# Patient Record
Sex: MALE | Race: WHITE | NOT HISPANIC OR LATINO
[De-identification: names, ages, dates, MRNs, and addresses within clinical notes are randomized per-mention and may not be internally consistent; named-entity substitution may affect disease eponyms.]

---

## 2019-08-28 ENCOUNTER — APPOINTMENT (OUTPATIENT)
Dept: ORTHOPEDIC SURGERY | Facility: CLINIC | Age: 74
End: 2019-08-28

## 2021-04-28 ENCOUNTER — APPOINTMENT (OUTPATIENT)
Dept: HEART AND VASCULAR | Facility: CLINIC | Age: 76
End: 2021-04-28

## 2021-05-26 ENCOUNTER — APPOINTMENT (OUTPATIENT)
Dept: HEART AND VASCULAR | Facility: CLINIC | Age: 76
End: 2021-05-26

## 2021-06-09 ENCOUNTER — APPOINTMENT (OUTPATIENT)
Dept: HEART AND VASCULAR | Facility: CLINIC | Age: 76
End: 2021-06-09
Payer: MEDICARE

## 2021-06-09 ENCOUNTER — APPOINTMENT (OUTPATIENT)
Dept: HEART AND VASCULAR | Facility: CLINIC | Age: 76
End: 2021-06-09

## 2021-06-09 VITALS — DIASTOLIC BLOOD PRESSURE: 82 MMHG | SYSTOLIC BLOOD PRESSURE: 124 MMHG

## 2021-06-09 VITALS — HEIGHT: 70 IN | WEIGHT: 175 LBS | BODY MASS INDEX: 25.05 KG/M2

## 2021-06-09 VITALS — TEMPERATURE: 98 F

## 2021-06-09 DIAGNOSIS — Z00.00 ENCOUNTER FOR GENERAL ADULT MEDICAL EXAMINATION W/OUT ABNORMAL FINDINGS: ICD-10-CM

## 2021-06-09 PROCEDURE — 93351 STRESS TTE COMPLETE: CPT

## 2021-06-09 PROCEDURE — 36415 COLL VENOUS BLD VENIPUNCTURE: CPT

## 2021-06-09 PROCEDURE — 99214 OFFICE O/P EST MOD 30 MIN: CPT | Mod: 25

## 2021-06-09 PROCEDURE — 93306 TTE W/DOPPLER COMPLETE: CPT | Mod: 59

## 2021-06-10 ENCOUNTER — NON-APPOINTMENT (OUTPATIENT)
Age: 76
End: 2021-06-10

## 2021-06-10 NOTE — ASSESSMENT
[FreeTextEntry1] : Patient had a negative stress echo patient's exercise tolerance is within normal limits patient should have a follow-up CAT scan to evaluate the descending aortic aneurysm

## 2021-06-10 NOTE — REASON FOR VISIT
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

## 2021-06-14 LAB
ALBUMIN SERPL ELPH-MCNC: 4.8 G/DL
ALP BLD-CCNC: 95 U/L
ALT SERPL-CCNC: 30 U/L
ANION GAP SERPL CALC-SCNC: 13 MMOL/L
AST SERPL-CCNC: 27 U/L
BASOPHILS # BLD AUTO: 0.05 K/UL
BASOPHILS NFR BLD AUTO: 0.6 %
BILIRUB SERPL-MCNC: 0.5 MG/DL
BUN SERPL-MCNC: 17 MG/DL
CALCIUM SERPL-MCNC: 10.2 MG/DL
CHLORIDE SERPL-SCNC: 102 MMOL/L
CHOLEST SERPL-MCNC: 111 MG/DL
CK SERPL-CCNC: 114 U/L
CO2 SERPL-SCNC: 22 MMOL/L
CREAT SERPL-MCNC: 0.86 MG/DL
EOSINOPHIL # BLD AUTO: 0.21 K/UL
EOSINOPHIL NFR BLD AUTO: 2.7 %
ESTIMATED AVERAGE GLUCOSE: 111 MG/DL
FERRITIN SERPL-MCNC: 52 NG/ML
GLUCOSE SERPL-MCNC: 92 MG/DL
HBA1C MFR BLD HPLC: 5.5 %
HCT VFR BLD CALC: 42.5 %
HDLC SERPL-MCNC: 39 MG/DL
HGB BLD-MCNC: 14.8 G/DL
IMM GRANULOCYTES NFR BLD AUTO: 0.6 %
LDLC SERPL CALC-MCNC: 39 MG/DL
LYMPHOCYTES # BLD AUTO: 1.46 K/UL
LYMPHOCYTES NFR BLD AUTO: 18.8 %
MAN DIFF?: NORMAL
MCHC RBC-ENTMCNC: 32.7 PG
MCHC RBC-ENTMCNC: 34.8 GM/DL
MCV RBC AUTO: 93.8 FL
MONOCYTES # BLD AUTO: 0.61 K/UL
MONOCYTES NFR BLD AUTO: 7.9 %
NEUTROPHILS # BLD AUTO: 5.38 K/UL
NEUTROPHILS NFR BLD AUTO: 69.4 %
NONHDLC SERPL-MCNC: 72 MG/DL
PLATELET # BLD AUTO: 376 K/UL
POTASSIUM SERPL-SCNC: 4.8 MMOL/L
PROT SERPL-MCNC: 7.8 G/DL
PSA FREE FLD-MCNC: 28 %
PSA FREE SERPL-MCNC: 0.17 NG/ML
PSA SERPL-MCNC: 0.6 NG/ML
RBC # BLD: 4.53 M/UL
RBC # FLD: 12.6 %
SODIUM SERPL-SCNC: 138 MMOL/L
TRIGL SERPL-MCNC: 168 MG/DL
TSH SERPL-ACNC: 1.49 UIU/ML
VIT B12 SERPL-MCNC: 1904 PG/ML
VLDLC SERPL-MCNC: 34 MG/DL
WBC # FLD AUTO: 7.76 K/UL

## 2021-09-29 ENCOUNTER — APPOINTMENT (OUTPATIENT)
Dept: HEART AND VASCULAR | Facility: CLINIC | Age: 76
End: 2021-09-29
Payer: MEDICARE

## 2021-09-29 VITALS
BODY MASS INDEX: 24.77 KG/M2 | HEIGHT: 70 IN | HEART RATE: 86 BPM | SYSTOLIC BLOOD PRESSURE: 140 MMHG | WEIGHT: 173 LBS | OXYGEN SATURATION: 97 % | DIASTOLIC BLOOD PRESSURE: 88 MMHG

## 2021-09-29 PROCEDURE — 99214 OFFICE O/P EST MOD 30 MIN: CPT

## 2021-09-30 NOTE — ASSESSMENT
[FreeTextEntry1] : Patient with a left bundle branch block had a recent stress echo that was negative patient had one episode where he was running for the [email or web address] where he felt a chest tightness patient had a CTA back in 2008 that had normal coronaries will send patient for a repeat CTA

## 2021-10-04 ENCOUNTER — OUTPATIENT (OUTPATIENT)
Dept: OUTPATIENT SERVICES | Facility: HOSPITAL | Age: 76
LOS: 1 days | End: 2021-10-04

## 2021-10-04 ENCOUNTER — APPOINTMENT (OUTPATIENT)
Dept: CT IMAGING | Facility: CLINIC | Age: 76
End: 2021-10-04
Payer: MEDICARE

## 2021-10-04 DIAGNOSIS — Z98.89 OTHER SPECIFIED POSTPROCEDURAL STATES: Chronic | ICD-10-CM

## 2021-10-04 PROCEDURE — 75574 CT ANGIO HRT W/3D IMAGE: CPT | Mod: 26,MH

## 2022-05-31 ENCOUNTER — APPOINTMENT (OUTPATIENT)
Dept: UROLOGY | Facility: CLINIC | Age: 77
End: 2022-05-31

## 2022-06-14 ENCOUNTER — APPOINTMENT (OUTPATIENT)
Dept: UROLOGY | Facility: CLINIC | Age: 77
End: 2022-06-14
Payer: MEDICARE

## 2022-06-14 VITALS — TEMPERATURE: 97.7 F | HEART RATE: 84 BPM | SYSTOLIC BLOOD PRESSURE: 172 MMHG | DIASTOLIC BLOOD PRESSURE: 95 MMHG

## 2022-06-14 DIAGNOSIS — N52.9 MALE ERECTILE DYSFUNCTION, UNSPECIFIED: ICD-10-CM

## 2022-06-14 DIAGNOSIS — N39.3 STRESS INCONTINENCE (FEMALE) (MALE): ICD-10-CM

## 2022-06-14 PROCEDURE — 99204 OFFICE O/P NEW MOD 45 MIN: CPT

## 2022-06-14 PROCEDURE — 51798 US URINE CAPACITY MEASURE: CPT

## 2022-06-14 NOTE — PHYSICAL EXAM
[General Appearance - Well Developed] : well developed [General Appearance - Well Nourished] : well nourished [Normal Appearance] : normal appearance [Well Groomed] : well groomed [General Appearance - In No Acute Distress] : no acute distress [Edema] : no peripheral edema [Respiration, Rhythm And Depth] : normal respiratory rhythm and effort [Exaggerated Use Of Accessory Muscles For Inspiration] : no accessory muscle use [Abdomen Soft] : soft [Abdomen Tenderness] : non-tender [Urethral Meatus] : meatus normal [Penis Abnormality] : normal circumcised penis [Urinary Bladder Findings] : the bladder was normal on palpation [Scrotum] : the scrotum was normal [Testes Mass (___cm)] : there were no testicular masses [Prostate Tenderness] : the prostate was not tender [No Prostate Nodules] : no prostate nodules [Prostate Size ___ gm] : prostate size [unfilled] gm [Normal Station and Gait] : the gait and station were normal for the patient's age [] : no rash [No Focal Deficits] : no focal deficits [Oriented To Time, Place, And Person] : oriented to person, place, and time [Affect] : the affect was normal [Mood] : the mood was normal [Not Anxious] : not anxious

## 2022-06-14 NOTE — ASSESSMENT
[FreeTextEntry1] : Diagnosis: Urinary incontinence, Pelvic Floor weakness\par \par Plan:\par PVR today was 34 cc\par discussed behavioral modifications\par Recommend he start with physical therapy.  If no improvement after physical therapy then suggest follow up with cystoscopy and possible UDS.\par \par ED\par -would like to start Trimix again.  It has been a while since he has used medication and is unsure of dosing.  Recommend he follow up with Dr. Damon for evaluation.\par \par RTC in 6 months \par

## 2022-06-14 NOTE — HISTORY OF PRESENT ILLNESS
[FreeTextEntry1] : Dr. Indra Kimble\par [address]\[place] [address]\par \par \par Mr. Celaya is a 80 year old male with a pMHx significant for HTN and MGUS (treated with Curcumin). \par \par Greenlight laser surgery 2019 for urinary frequency was on Tamsulosin prior to procedure but he stopped that..  Also noted possible  UTI after the Greenlight but may have been prior to Greenlight, not positive.\par \par He did notice improvement in his urinary symptoms after the Greenlight BUT developed urinary leakage post procedure.\par Over he past year the incontinence has worsened, " at times i look down and notice urine on the floor."   Notices more at night.  Urinary incontinence is exacerbated by fatigue and coffee.\par Will occasionally wear 1 pad.\par \par Also reports some difficulty obtaining erections \par Rates erections 5/10.  Was using Trimix and getting good response.\par \par Takes Finasteride 1 mg daily for a few years, stopped 1 week ago.\par \par PSA screening by Dr. Kimble, which have all been within the normal range.\par \par CT done in May 2022 for loss of appetite\par Renal cysts noted bilaterally, largest in the left lower pole measuring 8.5 cm\par \par PVR today is 34 cc\par \par \par Surgical Hx: Greenlight laser 2019, Radical neck surgery 1965.\par Social Hx: nonsmoker, minimal ETOH use, works as professor of weight loss\par Family Hx: 3 sisters with breast cancer, 3 cousins with Multiple myeloma

## 2022-06-15 LAB — PSA SERPL-MCNC: 0.69 NG/ML

## 2022-07-13 ENCOUNTER — APPOINTMENT (OUTPATIENT)
Dept: PULMONOLOGY | Facility: CLINIC | Age: 77
End: 2022-07-13

## 2022-07-13 VITALS
HEART RATE: 86 BPM | WEIGHT: 171 LBS | OXYGEN SATURATION: 98 % | BODY MASS INDEX: 24.48 KG/M2 | SYSTOLIC BLOOD PRESSURE: 144 MMHG | DIASTOLIC BLOOD PRESSURE: 91 MMHG | TEMPERATURE: 97.3 F | HEIGHT: 70 IN

## 2022-07-13 DIAGNOSIS — Z11.52 ENCOUNTER FOR SCREENING FOR COVID-19: ICD-10-CM

## 2022-07-13 PROCEDURE — 99204 OFFICE O/P NEW MOD 45 MIN: CPT | Mod: CS

## 2022-07-13 NOTE — REVIEW OF SYSTEMS
[Chest Discomfort] : chest discomfort [Fever] : no fever [Fatigue] : no fatigue [Cough] : no cough [Sputum] : no sputum [Dyspnea] : no dyspnea [SOB on Exertion] : no sob on exertion

## 2022-07-13 NOTE — ASSESSMENT
[FreeTextEntry1] : REVIEWED\par CT CHEST [facility] import 5/9/2022: tiny L pleural based nodule 2mm, otherwise normal\par \par 77M with history of melanoma s/p resection presents for evaluation for chest pain/SOB with exertion. Dyspnea is likely in setting of deconditioning and decreased exercise, and no evidence of parenchymal lung disease on CT chest. However, will obtain PFTs to rule out occult obstructive or restrictive lung disease, though there is no evidence of this on imaging.

## 2022-07-13 NOTE — HISTORY OF PRESENT ILLNESS
[Never] : never [TextBox_4] : 77M with history of melanoma (age 20, s/p resection and radical neck dissection; recurrence few years later at bridge of nose s/p resection and reconstruction) presents for evaluation for chest pain/SOB with exertion. Chest tightness on severe exertion- in day to day life no chest pain, able to ambulate and exert himself normally, can walk over a mile. Reduced his running during the pandemic, otherwise still active. SOB during running. No headaches, dizziness. Cardiac workup normal. Intermittent THC use few times a week, otherwise never smoker. No pets, no mold, no other exposures. Former professor at a med school. [ESS] : 0

## 2022-07-13 NOTE — END OF VISIT
[] : Fellow [FreeTextEntry3] : discussed the condition in details with the patient follow on PFT [Time Spent: ___ minutes] : I have spent [unfilled] minutes of time on the encounter.

## 2022-08-03 ENCOUNTER — APPOINTMENT (OUTPATIENT)
Dept: HEART AND VASCULAR | Facility: CLINIC | Age: 77
End: 2022-08-03

## 2022-08-03 ENCOUNTER — NON-APPOINTMENT (OUTPATIENT)
Age: 77
End: 2022-08-03

## 2022-08-03 VITALS
SYSTOLIC BLOOD PRESSURE: 128 MMHG | HEART RATE: 79 BPM | BODY MASS INDEX: 25.62 KG/M2 | HEIGHT: 70 IN | OXYGEN SATURATION: 97 % | DIASTOLIC BLOOD PRESSURE: 72 MMHG | WEIGHT: 179 LBS | TEMPERATURE: 97.1 F

## 2022-08-03 DIAGNOSIS — R06.02 SHORTNESS OF BREATH: ICD-10-CM

## 2022-08-03 PROCEDURE — 93000 ELECTROCARDIOGRAM COMPLETE: CPT

## 2022-08-03 PROCEDURE — 36415 COLL VENOUS BLD VENIPUNCTURE: CPT

## 2022-08-03 PROCEDURE — 99214 OFFICE O/P EST MOD 30 MIN: CPT | Mod: 25

## 2022-08-04 PROBLEM — R06.02 SHORTNESS OF BREATH: Status: ACTIVE | Noted: 2022-07-13

## 2022-08-04 LAB
ALBUMIN SERPL ELPH-MCNC: 4.6 G/DL
ALP BLD-CCNC: 86 U/L
ALT SERPL-CCNC: 33 U/L
ANION GAP SERPL CALC-SCNC: 10 MMOL/L
AST SERPL-CCNC: 32 U/L
BASOPHILS # BLD AUTO: 0.04 K/UL
BASOPHILS NFR BLD AUTO: 0.6 %
BILIRUB SERPL-MCNC: 0.2 MG/DL
BUN SERPL-MCNC: 19 MG/DL
CALCIUM SERPL-MCNC: 10.6 MG/DL
CHLORIDE SERPL-SCNC: 101 MMOL/L
CHOLEST SERPL-MCNC: 115 MG/DL
CK SERPL-CCNC: 120 U/L
CO2 SERPL-SCNC: 27 MMOL/L
CREAT SERPL-MCNC: 0.99 MG/DL
EGFR: 79 ML/MIN/1.73M2
EOSINOPHIL # BLD AUTO: 0.27 K/UL
EOSINOPHIL NFR BLD AUTO: 4.3 %
ESTIMATED AVERAGE GLUCOSE: 108 MG/DL
FERRITIN SERPL-MCNC: 43 NG/ML
GLUCOSE SERPL-MCNC: 110 MG/DL
HBA1C MFR BLD HPLC: 5.4 %
HCT VFR BLD CALC: 40 %
HDLC SERPL-MCNC: 38 MG/DL
HGB BLD-MCNC: 13.9 G/DL
IMM GRANULOCYTES NFR BLD AUTO: 0 %
INR PPP: 0.99 RATIO
LDLC SERPL CALC-MCNC: 38 MG/DL
LYMPHOCYTES # BLD AUTO: 1.52 K/UL
LYMPHOCYTES NFR BLD AUTO: 24.3 %
MAN DIFF?: NORMAL
MCHC RBC-ENTMCNC: 32.6 PG
MCHC RBC-ENTMCNC: 34.8 GM/DL
MCV RBC AUTO: 93.9 FL
MONOCYTES # BLD AUTO: 0.6 K/UL
MONOCYTES NFR BLD AUTO: 9.6 %
NEUTROPHILS # BLD AUTO: 3.83 K/UL
NEUTROPHILS NFR BLD AUTO: 61.2 %
NONHDLC SERPL-MCNC: 78 MG/DL
PLATELET # BLD AUTO: 334 K/UL
POTASSIUM SERPL-SCNC: 4.7 MMOL/L
PROT SERPL-MCNC: 7.3 G/DL
PSA SERPL-MCNC: 0.45 NG/ML
PT BLD: 11.6 SEC
RBC # BLD: 4.26 M/UL
RBC # FLD: 12.9 %
SODIUM SERPL-SCNC: 138 MMOL/L
TRIGL SERPL-MCNC: 198 MG/DL
TSH SERPL-ACNC: 2.33 UIU/ML
VIT B12 SERPL-MCNC: 1555 PG/ML
WBC # FLD AUTO: 6.26 K/UL

## 2022-08-04 NOTE — HISTORY OF PRESENT ILLNESS
[FreeTextEntry1] : pt with lbbb and elevated calcium score cta in October 2021 showed minimal disease Pt has had a couple of weeks of coughing sao2 sat 95 pt had a negative covid test \par pt is able to walk up several flights of stairs \par pt is planning to have extensive plastic surgery

## 2022-08-08 ENCOUNTER — APPOINTMENT (OUTPATIENT)
Dept: PULMONOLOGY | Facility: CLINIC | Age: 77
End: 2022-08-08

## 2022-08-17 ENCOUNTER — LABORATORY RESULT (OUTPATIENT)
Age: 77
End: 2022-08-17

## 2022-08-17 ENCOUNTER — APPOINTMENT (OUTPATIENT)
Dept: HEART AND VASCULAR | Facility: CLINIC | Age: 77
End: 2022-08-17

## 2022-08-17 ENCOUNTER — OUTPATIENT (OUTPATIENT)
Dept: OUTPATIENT SERVICES | Facility: HOSPITAL | Age: 77
LOS: 1 days | End: 2022-08-17
Payer: MEDICARE

## 2022-08-17 ENCOUNTER — APPOINTMENT (OUTPATIENT)
Dept: MRI IMAGING | Facility: HOSPITAL | Age: 77
End: 2022-08-17

## 2022-08-17 VITALS
WEIGHT: 158 LBS | BODY MASS INDEX: 22.62 KG/M2 | HEIGHT: 70 IN | HEART RATE: 70 BPM | SYSTOLIC BLOOD PRESSURE: 138 MMHG | DIASTOLIC BLOOD PRESSURE: 92 MMHG

## 2022-08-17 DIAGNOSIS — Z98.89 OTHER SPECIFIED POSTPROCEDURAL STATES: Chronic | ICD-10-CM

## 2022-08-17 LAB
ALBUMIN SERPL ELPH-MCNC: 4.6 G/DL
ALP BLD-CCNC: 74 U/L
ALT SERPL-CCNC: 30 U/L
ANION GAP SERPL CALC-SCNC: 18 MMOL/L
APPEARANCE: CLEAR
APTT BLD: 29.1 SEC
AST SERPL-CCNC: 29 U/L
BASOPHILS # BLD AUTO: 0.04 K/UL
BASOPHILS NFR BLD AUTO: 0.6 %
BILIRUB SERPL-MCNC: 0.2 MG/DL
BILIRUBIN URINE: NEGATIVE
BLOOD URINE: NEGATIVE
BUN SERPL-MCNC: 20 MG/DL
CALCIUM SERPL-MCNC: 10.5 MG/DL
CHLORIDE SERPL-SCNC: 101 MMOL/L
CHOLEST SERPL-MCNC: 108 MG/DL
CK SERPL-CCNC: 110 U/L
CO2 SERPL-SCNC: 20 MMOL/L
COLOR: NORMAL
CREAT SERPL-MCNC: 0.78 MG/DL
EGFR: 92 ML/MIN/1.73M2
EOSINOPHIL # BLD AUTO: 0.25 K/UL
EOSINOPHIL NFR BLD AUTO: 3.9 %
GLUCOSE QUALITATIVE U: NEGATIVE
GLUCOSE SERPL-MCNC: 101 MG/DL
HCT VFR BLD CALC: 39.8 %
HDLC SERPL-MCNC: 39 MG/DL
HGB BLD-MCNC: 14 G/DL
IMM GRANULOCYTES NFR BLD AUTO: 0.6 %
INR PPP: 0.98 RATIO
KETONES URINE: NEGATIVE
LDLC SERPL CALC-MCNC: 37 MG/DL
LEUKOCYTE ESTERASE URINE: ABNORMAL
LYMPHOCYTES # BLD AUTO: 1.93 K/UL
LYMPHOCYTES NFR BLD AUTO: 30.3 %
MAN DIFF?: NORMAL
MCHC RBC-ENTMCNC: 31.3 PG
MCHC RBC-ENTMCNC: 35.2 GM/DL
MCV RBC AUTO: 88.8 FL
MONOCYTES # BLD AUTO: 0.64 K/UL
MONOCYTES NFR BLD AUTO: 10.1 %
NEUTROPHILS # BLD AUTO: 3.46 K/UL
NEUTROPHILS NFR BLD AUTO: 54.5 %
NITRITE URINE: NEGATIVE
NONHDLC SERPL-MCNC: 68 MG/DL
PH URINE: 6.5
PLATELET # BLD AUTO: 327 K/UL
POTASSIUM SERPL-SCNC: 5 MMOL/L
PROT SERPL-MCNC: 7.5 G/DL
PROTEIN URINE: NORMAL
PT BLD: 11.5 SEC
RBC # BLD: 4.48 M/UL
RBC # FLD: 12.5 %
SODIUM SERPL-SCNC: 139 MMOL/L
SPECIFIC GRAVITY URINE: 1.01
TRIGL SERPL-MCNC: 153 MG/DL
UROBILINOGEN URINE: NORMAL
WBC # FLD AUTO: 6.36 K/UL

## 2022-08-17 PROCEDURE — 93351 STRESS TTE COMPLETE: CPT

## 2022-08-17 PROCEDURE — 70543 MRI ORBT/FAC/NCK W/O &W/DYE: CPT | Mod: MB

## 2022-08-17 PROCEDURE — 99214 OFFICE O/P EST MOD 30 MIN: CPT | Mod: 25

## 2022-08-17 PROCEDURE — A9585: CPT

## 2022-08-17 PROCEDURE — 70543 MRI ORBT/FAC/NCK W/O &W/DYE: CPT | Mod: 26,MB

## 2022-08-17 NOTE — ASSESSMENT
[FreeTextEntry1] : Patient has a left bundle branch block patient has had some shortness of breath thinks it may have been COVID-related  patient has a very elevated calcium score and nonobstructive CAD on a CT angiogram done October 4, 2021 patient had a recent CT of the chest that showed his aorta at 4.5 cm and there were  no significant findings in the parenchyma\par Patient's lipid  panel shows an LDL of 38 also his HDL was 38\par pt with negative stress echocardiogram Patient is cleared for surgery with blood pressure,ekg and o2 sat monitoring

## 2022-08-18 LAB
ESTIMATED AVERAGE GLUCOSE: 111 MG/DL
FERRITIN SERPL-MCNC: 51 NG/ML
HBA1C MFR BLD HPLC: 5.5 %
TSH SERPL-ACNC: 2.9 UIU/ML
VIT B12 SERPL-MCNC: 1541 PG/ML

## 2022-08-25 ENCOUNTER — TRANSCRIPTION ENCOUNTER (OUTPATIENT)
Age: 77
End: 2022-08-25

## 2022-11-23 ENCOUNTER — APPOINTMENT (OUTPATIENT)
Dept: HEART AND VASCULAR | Facility: CLINIC | Age: 77
End: 2022-11-23

## 2022-11-23 PROCEDURE — 99214 OFFICE O/P EST MOD 30 MIN: CPT | Mod: 25

## 2022-11-23 PROCEDURE — 93306 TTE W/DOPPLER COMPLETE: CPT

## 2022-11-28 ENCOUNTER — OUTPATIENT (OUTPATIENT)
Dept: OUTPATIENT SERVICES | Facility: HOSPITAL | Age: 77
LOS: 1 days | End: 2022-11-28

## 2022-11-28 ENCOUNTER — RESULT REVIEW (OUTPATIENT)
Age: 77
End: 2022-11-28

## 2022-11-28 ENCOUNTER — APPOINTMENT (OUTPATIENT)
Dept: MRI IMAGING | Facility: CLINIC | Age: 77
End: 2022-11-28

## 2022-11-28 DIAGNOSIS — Z98.89 OTHER SPECIFIED POSTPROCEDURAL STATES: Chronic | ICD-10-CM

## 2022-11-28 PROCEDURE — 71555 MRI ANGIO CHEST W OR W/O DYE: CPT | Mod: 26,MH

## 2022-11-28 NOTE — HISTORY OF PRESENT ILLNESS
[FreeTextEntry1] : Patient with a history of an aortic aneurysm patient has hypertension hyperlipidemia and a left bundle branch block patient was noted in Dr. Boateng's office to have unequal blood pressures in his arms patient has no chest pain no shortness of breath no back pain

## 2022-11-28 NOTE — REASON FOR VISIT
[Structural Heart and Valve Disease] : structural heart and valve disease [Hyperlipidemia] : hyperlipidemia [Carotid, Aortic and Peripheral Vascular Disease] : carotid, aortic and peripheral vascular disease

## 2022-11-28 NOTE — ASSESSMENT
[FreeTextEntry1] : Patient has a known aneurysm of the ascending aorta measuring 4.5 at the main pulmonary artery patient's echocardiogram today shows a normal root with aneurysmal dilatation and a fusiform pattern in the ascending aorta patient had been noted to have unequal blood pressures at his internist I did multiple blood pressure readings  that were equal patient's echocardiogram today showed the ascending aorta to be 4.5-4.6 unchanged from previous echocardiograms patient will be sent for an M RA of the aorta with and without contrast\par \par Patient has no chest pain back pain shortness of breath at rest or with exercise\par \par Patient has known nonobstructive coronary artery disease\par \par Patient has a left bundle branch block\par \par Patient's blood pressure is controlled on current medications that include losartan and metoprolol

## 2022-12-14 ENCOUNTER — OUTPATIENT (OUTPATIENT)
Dept: OUTPATIENT SERVICES | Facility: HOSPITAL | Age: 77
LOS: 1 days | End: 2022-12-14
Payer: MEDICARE

## 2022-12-14 ENCOUNTER — APPOINTMENT (OUTPATIENT)
Dept: MRI IMAGING | Facility: HOSPITAL | Age: 77
End: 2022-12-14

## 2022-12-14 DIAGNOSIS — Z98.89 OTHER SPECIFIED POSTPROCEDURAL STATES: Chronic | ICD-10-CM

## 2022-12-14 PROCEDURE — 72156 MRI NECK SPINE W/O & W/DYE: CPT | Mod: MH

## 2022-12-14 PROCEDURE — A9585: CPT

## 2022-12-14 PROCEDURE — 72156 MRI NECK SPINE W/O & W/DYE: CPT | Mod: 26,MH

## 2022-12-22 ENCOUNTER — APPOINTMENT (OUTPATIENT)
Dept: HEART AND VASCULAR | Facility: CLINIC | Age: 77
End: 2022-12-22

## 2022-12-22 VITALS
HEIGHT: 70 IN | DIASTOLIC BLOOD PRESSURE: 80 MMHG | WEIGHT: 174 LBS | SYSTOLIC BLOOD PRESSURE: 132 MMHG | BODY MASS INDEX: 24.91 KG/M2

## 2022-12-22 PROCEDURE — 99214 OFFICE O/P EST MOD 30 MIN: CPT | Mod: 25

## 2022-12-22 PROCEDURE — 36415 COLL VENOUS BLD VENIPUNCTURE: CPT

## 2022-12-22 PROCEDURE — 93015 CV STRESS TEST SUPVJ I&R: CPT

## 2022-12-22 RX ORDER — ROSUVASTATIN CALCIUM 10 MG/1
10 TABLET, FILM COATED ORAL
Qty: 90 | Refills: 3 | Status: ACTIVE | COMMUNITY
Start: 2022-12-22 | End: 1900-01-01

## 2022-12-22 NOTE — HISTORY OF PRESENT ILLNESS
[FreeTextEntry1] : Patient with a history of an aortic aneurysm patient has hypertension hyperlipidemia and a left bundle branch block patient was noted in Dr. Kimble's office to have unequal blood pressures in his arms patient has no chest pain no shortness of breath no back pain\par \par pt has three vessel coronary disease \par \par ===========================\par Labs/Diagnostics \par 2022\par Lipid panel: T, TC: 108, HDL: 39, LDL: 37\par A1c: 5.5%\par \par ECHO (2022): LVEF is mildly reduced with EF of 50%, mild-moderate LVH, trace aortic regurgitation, mild mitral regurgitation, proximal ascending aorta dilatation (AO 4.6 cm), and aortic arch dilatation (AO 4.6 cm)\par \par Stress EKG (2022): Pt. exercised according to the DARIA protocol for 8:26 minutes, achieving 10.3 METS. LBBB. No changes with exercise or wall motion by echocardiogram\par \par MR angio chest (2022): Since 2022, there has been no significant change in thoracic aortic aneurysm, again measuring 4.5 cm in the ascending aorta \par \par CT Angio (10/2021): CAC is severe at 610 Agatston units (68%); minimal to mild nonobstructive stenosis in all segments \par

## 2022-12-22 NOTE — REASON FOR VISIT
[Structural Heart and Valve Disease] : structural heart and valve disease [Hyperlipidemia] : hyperlipidemia [Hypertension] : hypertension [Coronary Artery Disease] : coronary artery disease [Carotid, Aortic and Peripheral Vascular Disease] : carotid, aortic and peripheral vascular disease [FreeTextEntry1] : Pt. is a 77 year old M with PMHx of HTN, HLD and thoracic aortic aneurysm who presents today for follow-up and stress echocardiogram.

## 2022-12-22 NOTE — PHYSICAL EXAM
[Normal Conjunctiva] : normal conjunctiva [Normal Venous Pressure] : normal venous pressure [No Carotid Bruit] : no carotid bruit [Normal S1, S2] : normal S1, S2 [No Murmur] : no murmur [Soft] : abdomen soft [Normal Gait] : normal gait [No Edema] : no edema [Normal] : alert and oriented, normal memory

## 2022-12-22 NOTE — ASSESSMENT
[FreeTextEntry1] : Patient has a known aneurysm of the ascending aorta measuring 4.5 at the main pulmonary artery patient's echocardiogram today shows a normal root with aneurysmal dilatation and a fusiform pattern in the ascending aorta patient had been noted to have unequal blood pressures at his internist I did multiple blood pressure readings that were equal patient's echocardiogram today showed the ascending aorta to be 4.5-4.6 unchanged from previous echocardiograms patient will be sent for an M RA of the aorta with and without contrast\par \par Patient has no chest pain back pain shortness of breath at rest or with exercise\par \par Patient has known nonobstructive coronary artery disease\par \par Patient has a left bundle branch block\par \par Patient's blood pressure is controlled on current medications that include losartan and metoprolol\par Patient is cleared for surgery with blood pressure, ekg and o2 sat monitoring.

## 2023-01-04 ENCOUNTER — APPOINTMENT (OUTPATIENT)
Dept: HEART AND VASCULAR | Facility: CLINIC | Age: 78
End: 2023-01-04

## 2023-02-22 ENCOUNTER — APPOINTMENT (OUTPATIENT)
Dept: HEART AND VASCULAR | Facility: CLINIC | Age: 78
End: 2023-02-22
Payer: MEDICARE

## 2023-02-22 VITALS
DIASTOLIC BLOOD PRESSURE: 80 MMHG | SYSTOLIC BLOOD PRESSURE: 128 MMHG | TEMPERATURE: 97.7 F | HEIGHT: 70 IN | OXYGEN SATURATION: 99 % | WEIGHT: 177 LBS | HEART RATE: 68 BPM | BODY MASS INDEX: 25.34 KG/M2

## 2023-02-22 PROCEDURE — 99214 OFFICE O/P EST MOD 30 MIN: CPT

## 2023-05-15 ENCOUNTER — APPOINTMENT (OUTPATIENT)
Dept: MRI IMAGING | Facility: HOSPITAL | Age: 78
End: 2023-05-15
Payer: MEDICARE

## 2023-05-15 ENCOUNTER — OUTPATIENT (OUTPATIENT)
Dept: OUTPATIENT SERVICES | Facility: HOSPITAL | Age: 78
LOS: 1 days | End: 2023-05-15
Payer: MEDICARE

## 2023-05-15 ENCOUNTER — APPOINTMENT (OUTPATIENT)
Dept: ULTRASOUND IMAGING | Facility: HOSPITAL | Age: 78
End: 2023-05-15
Payer: MEDICARE

## 2023-05-15 DIAGNOSIS — Z98.89 OTHER SPECIFIED POSTPROCEDURAL STATES: Chronic | ICD-10-CM

## 2023-05-15 PROCEDURE — 93880 EXTRACRANIAL BILAT STUDY: CPT | Mod: 26

## 2023-05-15 PROCEDURE — 70543 MRI ORBT/FAC/NCK W/O &W/DYE: CPT | Mod: 26,MH

## 2023-05-15 PROCEDURE — A9585: CPT

## 2023-05-15 PROCEDURE — 70543 MRI ORBT/FAC/NCK W/O &W/DYE: CPT | Mod: MH

## 2023-05-15 PROCEDURE — 93880 EXTRACRANIAL BILAT STUDY: CPT

## 2023-05-22 DIAGNOSIS — J34.89 OTHER SPECIFIED DISORDERS OF NOSE AND NASAL SINUSES: ICD-10-CM

## 2023-05-22 DIAGNOSIS — G93.0 CEREBRAL CYSTS: ICD-10-CM

## 2023-05-22 DIAGNOSIS — M26.12 OTHER JAW ASYMMETRY: ICD-10-CM

## 2023-05-22 DIAGNOSIS — R22.1 LOCALIZED SWELLING, MASS AND LUMP, NECK: ICD-10-CM

## 2023-05-22 DIAGNOSIS — I65.22 OCCLUSION AND STENOSIS OF LEFT CAROTID ARTERY: ICD-10-CM

## 2023-05-31 ENCOUNTER — APPOINTMENT (OUTPATIENT)
Dept: HEART AND VASCULAR | Facility: CLINIC | Age: 78
End: 2023-05-31
Payer: MEDICARE

## 2023-05-31 ENCOUNTER — NON-APPOINTMENT (OUTPATIENT)
Age: 78
End: 2023-05-31

## 2023-05-31 VITALS
TEMPERATURE: 97.8 F | DIASTOLIC BLOOD PRESSURE: 83 MMHG | BODY MASS INDEX: 25.77 KG/M2 | SYSTOLIC BLOOD PRESSURE: 130 MMHG | WEIGHT: 180.01 LBS | HEART RATE: 80 BPM | OXYGEN SATURATION: 96 % | HEIGHT: 70 IN

## 2023-05-31 PROCEDURE — 99214 OFFICE O/P EST MOD 30 MIN: CPT

## 2023-05-31 NOTE — HISTORY OF PRESENT ILLNESS
[FreeTextEntry1] : Piotr Celaya is here for follow up and management of HTN, HLD and aortic aneurysm. Latest echo and MRA in 2022 revealed unchanged ascending aorta measurements (4.5-4.6 cm). He is doing well with no CV complaints. He is having myalgias on Crestor. Will switch to Repatha once approved.

## 2023-05-31 NOTE — ASSESSMENT
[FreeTextEntry1] : CAD: nononstructive CAD by CCTA\par - Continue aggressive medical management\par \par HTN: BP at ACC/AHA 2017 guideline target but elevated yesterday due to salt meal the evening before \par - Continue Losartan 50mg daily\par - Counseled to limit dietary salt intake.\par -Claritin but unable to live without Claritin with allergy symptoms \par -tumeric, omega 3, ginseng for anti-oxidant \par \par Hyperlipidemia: Lipids at goal\par - Continue Crestor 10mg daily\par - Start Repatha sureclick \par - Discussed therapeutic lifestyle changes to promote improved lipid metabolism (low fat, low cholesterol heart healthy diet, striving for optimal weight control, and aerobic exercises as tolerated)\par - Will consider increasing dose if CAD depending on study results\par \par Aortic Aneurysm:\par - CTA of chest w/wo IV cont. to evaluate aortic size and for coarctation\par - Obtain echo for size and follow echo q6 months\par - Continue Toprol 25mg daily\par - Discussed avoidance of heavy weightlifting exercise\par - Will refer to Dr. Mccracken and enroll to aortic aneurysm clinic pending results (>4.5cm)\par

## 2023-05-31 NOTE — REASON FOR VISIT
English
[FreeTextEntry1] : EKG:\par Date:\par ---------------------------\par ECHO:\par 11/23/2022: LVEF is mildly reduced with EF of 50%, mild-moderate LVH, trace aortic regurgitation, mild mitral regurgitation, proximal ascending aorta dilatation (AO 4.6 cm), and aortic arch dilatation (AO 4.6 cm)\par ----------------------------\par Stress:\par 08/2022: Pt. exercised according to the DARIA protocol for 8:26 minutes, achieving 10.3 METS. LBBB. \par ----------------------------\par CCTA:\par 10/04/2021: Ca score 610. minimal to mild nonobstructive stenosis in all segments . Aorta measures 4.5cm at the sinuses of Valsalva.\par -------------------------------\par MR angio chest: \par 11/2022: There has been no significant change in thoracic aortic aneurysm compared to 5/9/2022, ascending aorta measuring 4.5 cm\par

## 2023-08-25 ENCOUNTER — APPOINTMENT (OUTPATIENT)
Dept: NEPHROLOGY | Facility: CLINIC | Age: 78
End: 2023-08-25
Payer: MEDICARE

## 2023-08-25 VITALS — SYSTOLIC BLOOD PRESSURE: 128 MMHG | HEART RATE: 76 BPM | DIASTOLIC BLOOD PRESSURE: 78 MMHG

## 2023-08-25 VITALS — SYSTOLIC BLOOD PRESSURE: 132 MMHG | DIASTOLIC BLOOD PRESSURE: 80 MMHG

## 2023-08-25 VITALS — DIASTOLIC BLOOD PRESSURE: 78 MMHG | SYSTOLIC BLOOD PRESSURE: 128 MMHG

## 2023-08-25 DIAGNOSIS — Z85.89 PERSONAL HISTORY OF MALIGNANT NEOPLASM OF OTHER ORGANS AND SYSTEMS: ICD-10-CM

## 2023-08-25 DIAGNOSIS — Z78.9 OTHER SPECIFIED HEALTH STATUS: ICD-10-CM

## 2023-08-25 PROCEDURE — 99205 OFFICE O/P NEW HI 60 MIN: CPT

## 2023-08-25 NOTE — CONSULT LETTER
[Dear  ___] : Dear ~JACK, [Please see my note below.] : Please see my note below. [Consult Closing:] : Thank you very much for allowing me to participate in the care of this patient.  If you have any questions, please do not hesitate to contact me. [FreeTextEntry2] : Tommie Kimble MD  [FreeTextEntry1] : His blood pressure was 128/78 mmHg in both arms. He had no signs of volume overload. We reviewed home BP measurement technique and positioning. I will rule out secondary causes of HTN and have him proceed with an ABPM. I will keep you apprised of my findings. [FreeTextEntry3] : Best personal regards, Mackenzie Aguilar MD, FACP Professor of Medicine [facility] [employer] [facility]

## 2023-08-25 NOTE — ASSESSMENT
[FreeTextEntry1] : all lab data was reviewed with patient in detail from EHR 77 yo man with labile HTN now on 3 agents. BP controlled here today and home monitor correlates with office equipment  ? masked HTN, technique variability. Some higher readings may be related to the manner in which he measured his BP send for labs to r/o secondary causes of HTN repeat u/a  after completes treatment for UTI reviewed home BP monitoring technique: seated position, arm supported on table- 3 measurements 2-5 minutes apart- record lowest BP-  had been taking while in his bed- take BID for 1 week monthly; same relative week; email readings to office proceed with ABPM  concern that some of his fatigue may be related to too low BPs TTM visit to review results OV here in office 6-8 weeks

## 2023-08-25 NOTE — HISTORY OF PRESENT ILLNESS
[FreeTextEntry1] : 79 yo man here for further evaluation and management of HTN. Aware of elevated BPs about 2 years- was started on Losartan and metoprolol-BPs 120- 140s for last 1.5 years Noted to have spikes in BP start of this year, and more frequently past 4-6 months- 2 weeks ago started on amlodipine 5 mg BID prior that, trial of diuretic- developed severe leg cramps h/o thoracic aneurysm monitored closely by cardio Is now exercising less frequently as BPs are up post exercise and this concerns him but does feel fatigued midday at times  and offers that he does feel groggy after his BP meds recently, weight up 9 pounds No HA, N, V No NSAIDs take tumeric and antiaging supplements

## 2023-08-25 NOTE — PHYSICAL EXAM
[General Appearance - Alert] : alert [General Appearance - In No Acute Distress] : in no acute distress [FreeTextEntry1] : 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 [] : no respiratory distress [Auscultation Breath Sounds / Voice Sounds] : lungs were clear to auscultation bilaterally [Heart Rate And Rhythm] : heart rate was normal and rhythm regular [Heart Sounds] : normal S1 and S2 [Heart Sounds Gallop] : no gallops [Murmurs] : no murmurs [Heart Sounds Pericardial Friction Rub] : no pericardial rub [Edema] : there was no peripheral edema [No CVA Tenderness] : no ~M costovertebral angle tenderness [Impaired Insight] : insight and judgment were intact [Oriented To Time, Place, And Person] : oriented to person, place, and time [Affect] : the affect was normal

## 2023-09-12 DIAGNOSIS — R07.9 CHEST PAIN, UNSPECIFIED: ICD-10-CM

## 2023-10-07 PROCEDURE — 99285 EMERGENCY DEPT VISIT HI MDM: CPT

## 2023-10-08 ENCOUNTER — INPATIENT (INPATIENT)
Facility: HOSPITAL | Age: 78
LOS: 0 days | Discharge: HOME | DRG: 73 | End: 2023-10-09
Attending: STUDENT IN AN ORGANIZED HEALTH CARE EDUCATION/TRAINING PROGRAM | Admitting: PSYCHIATRY & NEUROLOGY
Payer: COMMERCIAL

## 2023-10-08 VITALS
DIASTOLIC BLOOD PRESSURE: 103 MMHG | HEART RATE: 104 BPM | OXYGEN SATURATION: 94 % | RESPIRATION RATE: 18 BRPM | TEMPERATURE: 99 F | SYSTOLIC BLOOD PRESSURE: 186 MMHG

## 2023-10-08 DIAGNOSIS — Z98.89 OTHER SPECIFIED POSTPROCEDURAL STATES: Chronic | ICD-10-CM

## 2023-10-08 LAB
A1C WITH ESTIMATED AVERAGE GLUCOSE RESULT: 5.3 % — SIGNIFICANT CHANGE UP (ref 4–5.6)
ALBUMIN SERPL ELPH-MCNC: 4.5 G/DL — SIGNIFICANT CHANGE UP (ref 3.3–5)
ALP SERPL-CCNC: 76 U/L — SIGNIFICANT CHANGE UP (ref 40–120)
ALT FLD-CCNC: 32 U/L — SIGNIFICANT CHANGE UP (ref 10–45)
ANION GAP SERPL CALC-SCNC: 12 MMOL/L — SIGNIFICANT CHANGE UP (ref 5–17)
APPEARANCE UR: CLEAR — SIGNIFICANT CHANGE UP
APTT BLD: 27.3 SEC — SIGNIFICANT CHANGE UP (ref 24.5–35.6)
AST SERPL-CCNC: 35 U/L — SIGNIFICANT CHANGE UP (ref 10–40)
BACTERIA # UR AUTO: PRESENT /HPF
BASE EXCESS BLDV CALC-SCNC: 1.5 MMOL/L — SIGNIFICANT CHANGE UP (ref -2–3)
BASOPHILS # BLD AUTO: 0.06 K/UL — SIGNIFICANT CHANGE UP (ref 0–0.2)
BASOPHILS NFR BLD AUTO: 0.7 % — SIGNIFICANT CHANGE UP (ref 0–2)
BILIRUB SERPL-MCNC: 0.2 MG/DL — SIGNIFICANT CHANGE UP (ref 0.2–1.2)
BILIRUB UR-MCNC: NEGATIVE — SIGNIFICANT CHANGE UP
BUN SERPL-MCNC: 18 MG/DL — SIGNIFICANT CHANGE UP (ref 7–23)
CA-I SERPL-SCNC: 1.29 MMOL/L — SIGNIFICANT CHANGE UP (ref 1.15–1.33)
CALCIUM SERPL-MCNC: 10.6 MG/DL — HIGH (ref 8.4–10.5)
CHLORIDE SERPL-SCNC: 102 MMOL/L — SIGNIFICANT CHANGE UP (ref 96–108)
CHOLEST SERPL-MCNC: 122 MG/DL — SIGNIFICANT CHANGE UP
CO2 BLDV-SCNC: 28.6 MMOL/L — HIGH (ref 22–26)
CO2 SERPL-SCNC: 24 MMOL/L — SIGNIFICANT CHANGE UP (ref 22–31)
COLOR SPEC: YELLOW — SIGNIFICANT CHANGE UP
CREAT SERPL-MCNC: 0.84 MG/DL — SIGNIFICANT CHANGE UP (ref 0.5–1.3)
DIFF PNL FLD: NEGATIVE — SIGNIFICANT CHANGE UP
EGFR: 90 ML/MIN/1.73M2 — SIGNIFICANT CHANGE UP
EOSINOPHIL # BLD AUTO: 0.33 K/UL — SIGNIFICANT CHANGE UP (ref 0–0.5)
EOSINOPHIL NFR BLD AUTO: 3.6 % — SIGNIFICANT CHANGE UP (ref 0–6)
EPI CELLS # UR: SIGNIFICANT CHANGE UP /HPF (ref 0–5)
ESTIMATED AVERAGE GLUCOSE: 105 MG/DL — SIGNIFICANT CHANGE UP (ref 68–114)
GAS PNL BLDV: 132 MMOL/L — LOW (ref 136–145)
GAS PNL BLDV: SIGNIFICANT CHANGE UP
GAS PNL BLDV: SIGNIFICANT CHANGE UP
GLUCOSE BLDC GLUCOMTR-MCNC: 100 MG/DL — HIGH (ref 70–99)
GLUCOSE SERPL-MCNC: 122 MG/DL — HIGH (ref 70–99)
GLUCOSE UR QL: NEGATIVE — SIGNIFICANT CHANGE UP
HCO3 BLDV-SCNC: 27 MMOL/L — SIGNIFICANT CHANGE UP (ref 22–29)
HCT VFR BLD CALC: 41.2 % — SIGNIFICANT CHANGE UP (ref 39–50)
HDLC SERPL-MCNC: 31 MG/DL — LOW
HGB BLD-MCNC: 14.5 G/DL — SIGNIFICANT CHANGE UP (ref 13–17)
IMM GRANULOCYTES NFR BLD AUTO: 0.3 % — SIGNIFICANT CHANGE UP (ref 0–0.9)
INR BLD: 1.05 — SIGNIFICANT CHANGE UP (ref 0.85–1.18)
KETONES UR-MCNC: NEGATIVE — SIGNIFICANT CHANGE UP
LEUKOCYTE ESTERASE UR-ACNC: ABNORMAL
LIPID PNL WITH DIRECT LDL SERPL: 45 MG/DL — SIGNIFICANT CHANGE UP
LYMPHOCYTES # BLD AUTO: 2.71 K/UL — SIGNIFICANT CHANGE UP (ref 1–3.3)
LYMPHOCYTES # BLD AUTO: 29.7 % — SIGNIFICANT CHANGE UP (ref 13–44)
MCHC RBC-ENTMCNC: 31.7 PG — SIGNIFICANT CHANGE UP (ref 27–34)
MCHC RBC-ENTMCNC: 35.2 GM/DL — SIGNIFICANT CHANGE UP (ref 32–36)
MCV RBC AUTO: 90 FL — SIGNIFICANT CHANGE UP (ref 80–100)
MONOCYTES # BLD AUTO: 0.83 K/UL — SIGNIFICANT CHANGE UP (ref 0–0.9)
MONOCYTES NFR BLD AUTO: 9.1 % — SIGNIFICANT CHANGE UP (ref 2–14)
NEUTROPHILS # BLD AUTO: 5.16 K/UL — SIGNIFICANT CHANGE UP (ref 1.8–7.4)
NEUTROPHILS NFR BLD AUTO: 56.6 % — SIGNIFICANT CHANGE UP (ref 43–77)
NITRITE UR-MCNC: NEGATIVE — SIGNIFICANT CHANGE UP
NON HDL CHOLESTEROL: 91 MG/DL — SIGNIFICANT CHANGE UP
NRBC # BLD: 0 /100 WBCS — SIGNIFICANT CHANGE UP (ref 0–0)
PCO2 BLDV: 46 MMHG — SIGNIFICANT CHANGE UP (ref 42–55)
PH BLDV: 7.38 — SIGNIFICANT CHANGE UP (ref 7.32–7.43)
PH UR: 6 — SIGNIFICANT CHANGE UP (ref 5–8)
PLATELET # BLD AUTO: 398 K/UL — SIGNIFICANT CHANGE UP (ref 150–400)
PO2 BLDV: 78 MMHG — HIGH (ref 25–45)
POTASSIUM BLDV-SCNC: 4.6 MMOL/L — SIGNIFICANT CHANGE UP (ref 3.5–5.1)
POTASSIUM SERPL-MCNC: 4.4 MMOL/L — SIGNIFICANT CHANGE UP (ref 3.5–5.3)
POTASSIUM SERPL-SCNC: 4.4 MMOL/L — SIGNIFICANT CHANGE UP (ref 3.5–5.3)
PROT SERPL-MCNC: 8 G/DL — SIGNIFICANT CHANGE UP (ref 6–8.3)
PROT UR-MCNC: 100 MG/DL
PROTHROM AB SERPL-ACNC: 11.9 SEC — SIGNIFICANT CHANGE UP (ref 9.5–13)
RAPID RVP RESULT: SIGNIFICANT CHANGE UP
RBC # BLD: 4.58 M/UL — SIGNIFICANT CHANGE UP (ref 4.2–5.8)
RBC # FLD: 12.5 % — SIGNIFICANT CHANGE UP (ref 10.3–14.5)
RBC CASTS # UR COMP ASSIST: < 5 /HPF — SIGNIFICANT CHANGE UP
SAO2 % BLDV: 94.8 % — HIGH (ref 67–88)
SARS-COV-2 RNA SPEC QL NAA+PROBE: SIGNIFICANT CHANGE UP
SODIUM SERPL-SCNC: 138 MMOL/L — SIGNIFICANT CHANGE UP (ref 135–145)
SP GR SPEC: 1.01 — SIGNIFICANT CHANGE UP (ref 1–1.03)
TRIGL SERPL-MCNC: 231 MG/DL — HIGH
TROPONIN T, HIGH SENSITIVITY RESULT: 14 NG/L — SIGNIFICANT CHANGE UP (ref 0–51)
TSH SERPL-MCNC: 1.07 UIU/ML — SIGNIFICANT CHANGE UP (ref 0.27–4.2)
UROBILINOGEN FLD QL: 0.2 E.U./DL — SIGNIFICANT CHANGE UP
WBC # BLD: 9.12 K/UL — SIGNIFICANT CHANGE UP (ref 3.8–10.5)
WBC # FLD AUTO: 9.12 K/UL — SIGNIFICANT CHANGE UP (ref 3.8–10.5)
WBC UR QL: < 5 /HPF — SIGNIFICANT CHANGE UP

## 2023-10-08 PROCEDURE — 99222 1ST HOSP IP/OBS MODERATE 55: CPT

## 2023-10-08 PROCEDURE — 72146 MRI CHEST SPINE W/O DYE: CPT | Mod: 26

## 2023-10-08 PROCEDURE — 70450 CT HEAD/BRAIN W/O DYE: CPT | Mod: 26,MA,XU

## 2023-10-08 PROCEDURE — 71045 X-RAY EXAM CHEST 1 VIEW: CPT | Mod: 26

## 2023-10-08 PROCEDURE — 70498 CT ANGIOGRAPHY NECK: CPT | Mod: 26,MA

## 2023-10-08 PROCEDURE — 70551 MRI BRAIN STEM W/O DYE: CPT | Mod: 26

## 2023-10-08 PROCEDURE — 72141 MRI NECK SPINE W/O DYE: CPT | Mod: 26

## 2023-10-08 PROCEDURE — 0042T: CPT | Mod: MA

## 2023-10-08 PROCEDURE — 93010 ELECTROCARDIOGRAM REPORT: CPT

## 2023-10-08 PROCEDURE — 70496 CT ANGIOGRAPHY HEAD: CPT | Mod: 26,MA

## 2023-10-08 RX ORDER — FINASTERIDE 5 MG/1
1 TABLET, FILM COATED ORAL
Refills: 0 | DISCHARGE

## 2023-10-08 RX ORDER — SODIUM CHLORIDE 9 MG/ML
1000 INJECTION, SOLUTION INTRAVENOUS
Refills: 0 | Status: DISCONTINUED | OUTPATIENT
Start: 2023-10-08 | End: 2023-10-09

## 2023-10-08 RX ORDER — DEXTROSE 50 % IN WATER 50 %
25 SYRINGE (ML) INTRAVENOUS ONCE
Refills: 0 | Status: DISCONTINUED | OUTPATIENT
Start: 2023-10-08 | End: 2023-10-09

## 2023-10-08 RX ORDER — ENOXAPARIN SODIUM 100 MG/ML
40 INJECTION SUBCUTANEOUS EVERY 24 HOURS
Refills: 0 | Status: DISCONTINUED | OUTPATIENT
Start: 2023-10-08 | End: 2023-10-09

## 2023-10-08 RX ORDER — TRAZODONE HCL 50 MG
50 TABLET ORAL ONCE
Refills: 0 | Status: COMPLETED | OUTPATIENT
Start: 2023-10-08 | End: 2023-10-08

## 2023-10-08 RX ORDER — TRAZODONE HCL 50 MG
50 TABLET ORAL
Refills: 0 | DISCHARGE

## 2023-10-08 RX ORDER — EZETIMIBE 10 MG/1
1 TABLET ORAL
Refills: 0 | DISCHARGE

## 2023-10-08 RX ORDER — FINASTERIDE 5 MG/1
5 TABLET, FILM COATED ORAL DAILY
Refills: 0 | Status: DISCONTINUED | OUTPATIENT
Start: 2023-10-08 | End: 2023-10-09

## 2023-10-08 RX ORDER — INSULIN LISPRO 100/ML
VIAL (ML) SUBCUTANEOUS
Refills: 0 | Status: DISCONTINUED | OUTPATIENT
Start: 2023-10-08 | End: 2023-10-09

## 2023-10-08 RX ORDER — ROSUVASTATIN CALCIUM 5 MG/1
1 TABLET ORAL
Refills: 0 | DISCHARGE

## 2023-10-08 RX ORDER — CEFTRIAXONE 500 MG/1
1000 INJECTION, POWDER, FOR SOLUTION INTRAMUSCULAR; INTRAVENOUS EVERY 24 HOURS
Refills: 0 | Status: DISCONTINUED | OUTPATIENT
Start: 2023-10-09 | End: 2023-10-09

## 2023-10-08 RX ORDER — ALPRAZOLAM 0.25 MG
0.25 TABLET ORAL ONCE
Refills: 0 | Status: DISCONTINUED | OUTPATIENT
Start: 2023-10-08 | End: 2023-10-08

## 2023-10-08 RX ORDER — ENOXAPARIN SODIUM 100 MG/ML
40 INJECTION SUBCUTANEOUS EVERY 24 HOURS
Refills: 0 | Status: DISCONTINUED | OUTPATIENT
Start: 2023-10-08 | End: 2023-10-08

## 2023-10-08 RX ORDER — METOPROLOL TARTRATE 50 MG
25 TABLET ORAL DAILY
Refills: 0 | Status: DISCONTINUED | OUTPATIENT
Start: 2023-10-08 | End: 2023-10-09

## 2023-10-08 RX ORDER — DEXTROSE 50 % IN WATER 50 %
15 SYRINGE (ML) INTRAVENOUS ONCE
Refills: 0 | Status: DISCONTINUED | OUTPATIENT
Start: 2023-10-08 | End: 2023-10-09

## 2023-10-08 RX ORDER — ATORVASTATIN CALCIUM 80 MG/1
80 TABLET, FILM COATED ORAL AT BEDTIME
Refills: 0 | Status: DISCONTINUED | OUTPATIENT
Start: 2023-10-08 | End: 2023-10-09

## 2023-10-08 RX ORDER — AMLODIPINE BESYLATE 2.5 MG/1
1 TABLET ORAL
Refills: 0 | DISCHARGE

## 2023-10-08 RX ORDER — GLUCAGON INJECTION, SOLUTION 0.5 MG/.1ML
1 INJECTION, SOLUTION SUBCUTANEOUS ONCE
Refills: 0 | Status: DISCONTINUED | OUTPATIENT
Start: 2023-10-08 | End: 2023-10-09

## 2023-10-08 RX ORDER — LOSARTAN POTASSIUM 100 MG/1
1 TABLET, FILM COATED ORAL
Refills: 0 | DISCHARGE

## 2023-10-08 RX ORDER — DEXTROSE 50 % IN WATER 50 %
12.5 SYRINGE (ML) INTRAVENOUS ONCE
Refills: 0 | Status: DISCONTINUED | OUTPATIENT
Start: 2023-10-08 | End: 2023-10-09

## 2023-10-08 RX ORDER — ASPIRIN/CALCIUM CARB/MAGNESIUM 324 MG
81 TABLET ORAL DAILY
Refills: 0 | Status: DISCONTINUED | OUTPATIENT
Start: 2023-10-08 | End: 2023-10-09

## 2023-10-08 RX ORDER — METOPROLOL TARTRATE 50 MG
1 TABLET ORAL
Refills: 0 | DISCHARGE

## 2023-10-08 RX ORDER — CEFTRIAXONE 500 MG/1
1000 INJECTION, POWDER, FOR SOLUTION INTRAMUSCULAR; INTRAVENOUS EVERY 24 HOURS
Refills: 0 | Status: DISCONTINUED | OUTPATIENT
Start: 2023-10-08 | End: 2023-10-08

## 2023-10-08 RX ORDER — ALPRAZOLAM 0.25 MG
1 TABLET ORAL
Refills: 0 | DISCHARGE

## 2023-10-08 RX ORDER — HYDROCHLOROTHIAZIDE 25 MG
1 TABLET ORAL
Refills: 0 | DISCHARGE

## 2023-10-08 RX ADMIN — CEFTRIAXONE 100 MILLIGRAM(S): 500 INJECTION, POWDER, FOR SOLUTION INTRAMUSCULAR; INTRAVENOUS at 13:17

## 2023-10-08 RX ADMIN — ATORVASTATIN CALCIUM 80 MILLIGRAM(S): 80 TABLET, FILM COATED ORAL at 22:18

## 2023-10-08 RX ADMIN — ENOXAPARIN SODIUM 40 MILLIGRAM(S): 100 INJECTION SUBCUTANEOUS at 17:32

## 2023-10-08 RX ADMIN — Medication 81 MILLIGRAM(S): at 11:15

## 2023-10-08 RX ADMIN — Medication 25 MILLIGRAM(S): at 17:54

## 2023-10-08 RX ADMIN — FINASTERIDE 5 MILLIGRAM(S): 5 TABLET, FILM COATED ORAL at 11:15

## 2023-10-08 NOTE — ED ADULT TRIAGE NOTE - CHIEF COMPLAINT QUOTE
pt BIBA for eval of slurred speech and generalized weakness since 10PM seen normal by friend at bedside

## 2023-10-08 NOTE — CONSULT NOTE ADULT - ASSESSMENT
78yM PMH HTN, HLD, ED, thoracic aortic aneurysm, urinary incontinence, insomnnia, CAD who p/w slurred speech, generalized weakness, initial NIHSS 6, CTH neg, repeat NIHSS 2    #r/o CVA    #HTN    #HLD    #TAA    #urinary incontinence    #insomnia    #CAD    PT/OT 78yM PMH HTN, HLD, ED, thoracic aortic aneurysm, urinary incontinence, insomnnia, CAD who p/w slurred speech, generalized weakness, initial NIHSS 6, CTH neg, repeat NIHSS 2 however stroke workup negative, now transferred to medicine for further workup of fever and hypoxemia likely 2/2 aspiration    #r/o CVA  CTH/MRI negative  transferred to medicine    #hypoxemia  requiring NC  also with fever to 100.8  based on pts report may have aspirated  RVP negative  aspiration pna vs aspiration pneumonitis -- given need for O2 will start abx  wean O2 as able    #HTN  c/w home meds    #HLD  c/w home meds    #TAA  outpt f/u    #urinary incontinence  c/w home meds    #CAD  c/w aspirin    Dispo: stroke tele --> F, home when clinically improved

## 2023-10-08 NOTE — H&P ADULT - HISTORY OF PRESENT ILLNESS
78y Male with PMHx of HTN (has seen outpatient nephrology for resistant HTN w/u), HLD, erectile dysfunction, thoracic aortic aneurysm, urinary incontinence, insomnia, CAD  who presented to the ED for further evaluation of slurred speech and generalized weakness. Patient woke up in his USOH at 1 PM on 10/7 and went to dinner with his friends earlier in the evening. Friend at bedside, who's a retired physician, states that once they returned home from dinner at approx 10 PM patient was noted to have slurred speech and difficulty getting up out of his chair. Notes that patient did not ingest any alcohol or take any of his medications for sleep prior to the onset of his symptoms. Patient is independent at baseline, lives in a 2 story home, does not walk with any assistance. Friend noted that this was abnormal for the patient, prompting EMS to be called. Patient endorses feeling generally fatigued, didn't endorsed any weakness to one particular side of the body. Patient denies any focal numbness, tingling, dizziness, lightheadedness, headache, N/V, vision changes, neck pain. Upon arrival to the ED, patient with , HR in the low 100s to one teens, and slightly hypoxic sating 92-95% on RA with increased belly breathing. Stroke code was called. On initial examination, patient appeared to be somewhat altered with difficulty maintaining eye opening however responsive to voice. Speech did not appear to be overtly slurred however friend at bedside said that he did not sound like himself. No expressive or receptive aphasia. Initial NIHSS of 5 (R NLFF and b/l LEs with a drift, hit the bed < 5 seconds). Gave patient a camera to look at his face to see if flattening was his baseline, patient stated that he wasn't sure however patient's friend felt that it was. Notes that he does have a history of facioplasty and other surgeries on his face in the past. CTH, CTA H/N and CTP were unremarkable.Taken back to the ED and was noted to have an improvement in his BP to the 140s systolic. Given patient's encephalopathic appearance, recommended a toxic metabolic work up which was grossly unrevealing.     T(C): 36.6 (10-08-23 @ 03:12), Max: 37.1 (10-08-23 @ 00:05)  HR: 88 (10-08-23 @ 03:11) (88 - 104)  BP: 153/74 (10-08-23 @ 03:11) (138/71 - 186/103)  RR: 18 (10-08-23 @ 03:11) (18 - 20)  SpO2: 93% (10-08-23 @ 03:11) (93% - 98%)    PAST MEDICAL & SURGICAL HISTORY:  Aneurysm      HTN (hypertension)      HLD (hyperlipidemia)      BPH (benign prostatic hyperplasia)      H/O shoulder surgery      H/O neck surgery          FAMILY HISTORY:  No pertinent family history in first degree relatives        SOCIAL HISTORY:   Patient lives with *** at ***.   Smoking status:  Drinking:  Drug Use:     ROS: ***  Constitutional: No fever, weight loss or fatigue  Eyes: No eye pain, visual disturbances, or discharge  ENMT:  No difficulty hearing, tinnitus; No sinus or throat pain  Neck: No pain or stiffness  Respiratory: No cough, wheezing, chills or hemoptysis  Cardiovascular: No chest pain, palpitations, shortness of breath, or leg swelling  Gastrointestinal: No abdominal pain. No nausea, vomiting or hematemesis; No diarrhea or constipation. Nohematochezia.  Genitourinary: No dysuria, frequency, hematuria or incontinence  Neurological: As per HPI  Skin: No itching, burning, rashes or lesions   Endocrine: No heat or cold intolerance; No hair loss  Musculoskeletal: No joint pain or swelling; No muscle, back or extremity pain  Heme/Lymph: No easy bruising or bleeding gums    MEDICATIONS  (STANDING):  aspirin enteric coated 81 milliGRAM(s) Oral daily  atorvastatin 80 milliGRAM(s) Oral at bedtime  finasteride 5 milliGRAM(s) Oral daily    MEDICATIONS  (PRN):    Allergies    No Known Allergies    Intolerances      Vital Signs Last 24 Hrs  T(C): 36.6 (08 Oct 2023 03:12), Max: 37.1 (08 Oct 2023 00:05)  T(F): 97.8 (08 Oct 2023 03:12), Max: 98.7 (08 Oct 2023 00:05)  HR: 88 (08 Oct 2023 03:11) (88 - 104)  BP: 153/74 (08 Oct 2023 03:11) (138/71 - 186/103)  BP(mean): 104 (08 Oct 2023 03:11) (104 - 104)  RR: 18 (08 Oct 2023 03:11) (18 - 20)  SpO2: 93% (08 Oct 2023 03:11) (93% - 98%)    Parameters below as of 08 Oct 2023 03:11  Patient On (Oxygen Delivery Method): room air        Physical exam:  Constitutional: No acute distress, conversant  Eyes: Anicteric sclerae, moist conjunctivae, see below for CNs  Neck: trachea midline, FROM, supple, no thyromegaly or lymphadenopathy  Cardiovascular: Regular rate and rhythm, no murmurs, rubs, or gallops. No carotid bruits.   Pulmonary: Anterior breath sounds clear bilaterally, no crackles or wheezing. No use of accessory muscles  GI: Abdomen soft, non-distended, non-tender  Extremities: Radial and DP pulses +2, no edema    Neurologic:  -Mental status: Awake, alert, oriented to person, place, and time. Speech is fluent with intact naming, repetition, and comprehension, no dysarthria. Recent and remote memory intact. Follows commands. Attention/concentration intact. Fund of knowledge appropriate.  -Cranial nerves:   II: Visual fields are full to confrontation.  III, IV, VI: Extraocular movements are intact without nystagmus. Pupils equally round and reactive to light  V:  Facial sensation V1-V3 equal and intact   VII: Face is symmetric with normal eye closure and smile  VIII: Hearing is bilaterally intact to finger rub  IX, X: Uvula is midline and soft palate rises symmetrically  XI: Head turning and shoulder shrug are intact.  XII: Tongue protrudes midline  Motor: Normal bulk and tone. No pronator drift. Strength bilateral upper extremity 5/5, bilateral lower extremities 5/5.  Rapid alternating movements intact and symmetric  Sensation: Intact to light touch bilaterally. No neglect or extinction on double simultaneous testing.  Coordination: No dysmetria on finger-to-nose and heel-to-shin bilaterally  Reflexes: Downgoing toes bilaterally   Gait: Narrow gait and steady    NIHSS: **** ASPECT Score: ***** ICH Score: ****** (GCS)    Fingerstick Blood Glucose: CAPILLARY BLOOD GLUCOSE  113 (08 Oct 2023 00:39)      POCT Blood Glucose.: 113 mg/dL (07 Oct 2023 23:58)    LABS:                        14.5   9.12  )-----------( 398      ( 08 Oct 2023 00:23 )             41.2     10    138  |  102  |  18  ----------------------------<  122<H>  4.4   |  24  |  0.84    Ca    10.6<H>      08 Oct 2023 00:23    TPro  8.0  /  Alb  4.5  /  TBili  0.2  /  DBili  x   /  AST  35  /  ALT  32  /  AlkPhos  76  10-08    PT/INR - ( 08 Oct 2023 00:23 )   PT: 11.9 sec;   INR: 1.05          PTT - ( 08 Oct 2023 00:23 )  PTT:27.3 sec      Urinalysis Basic - ( 08 Oct 2023 01:59 )    Color: Yellow / Appearance: Clear / S.015 / pH: x  Gluc: x / Ketone: NEGATIVE  / Bili: Negative / Urobili: 0.2 E.U./dL   Blood: x / Protein: 100 mg/dL / Nitrite: NEGATIVE   Leuk Esterase: Trace / RBC: < 5 /HPF / WBC < 5 /HPF   Sq Epi: x / Non Sq Epi: x / Bacteria: Present /HPF        RADIOLOGY & ADDITIONAL STUDIES:      -----------------------------------------------------------------------------------------------------------------  IV-tPA (Y/N):    ***                              Bolus time:    Alteplase Dose Verification w/ RN:  Reason IV-tPA not given: ***   78y Male with PMHx of HTN (has seen outpatient nephrology for resistant HTN w/u), HLD, erectile dysfunction, thoracic aortic aneurysm (recommended repeat CTA in 2023 by cardiologist however don't see results in HIE), urinary incontinence, insomnia, CAD  who presented to the ED for further evaluation of slurred speech and generalized weakness. Patient woke up in his US[facility] at 1 PM on 10/7 and went to dinner with his friends earlier in the evening. Friend at bedside, who's a retired physician, states that once they returned home from dinner at approx 10 PM patient was noted to have slurred speech and difficulty getting up out of his chair. Notes that patient did not ingest any alcohol or take any of his medications for sleep prior to the onset of his symptoms. Patient is independent at baseline, lives in a 2 story home, does not walk with any assistance. Friend noted that this was abnormal for the patient, prompting EMS to be called. Patient endorses feeling generally fatigued, didn't endorsed any weakness to one particular side of the body. Patient denies any focal numbness, tingling, dizziness, lightheadedness, headache, N/V, vision changes, neck pain. Upon arrival to the ED, patient with , HR in the low 100s to one teens, and slightly hypoxic sating 92-95% on RA with WOB. Stroke code was called. On initial examination, patient appeared to be somewhat altered with difficulty maintaining eye opening for extended periods however responsive to voice. Speech did not appear to be overtly slurred however friend at bedside said that he did not sound like himself. No expressive or receptive aphasia. B/l UEs 4-/5 without drift, able to maintain AG > 10 seconds. Sensation intact. Initial NIHSS of 6 (? dysarthria, R NLFF and b/l LEs with a drift, hit the bed < 5 seconds). Gave patient a camera to look at his face to see if flattening was his baseline, patient stated that he wasn't sure however patient's friend felt that it was. Notes that he does have a history of facioplasty and other surgeries on his face in the past. CTH, CTA H/N and CTP were unremarkable. Taken back to the ED and was noted to have an improvement in his BP to the 160s systolic. Given patient's encephalopathic appearance, recommended a toxic metabolic work up which was grossly unrevealing. Reevaluated patient again in the ED, BP improved to the 140s systolic. Noted to have an improvement in his exam, now able to hold both of his legs up antigravity > 5 seconds without a drift. Another one of patient's friends appeared at bedside. Agrees that patient's face appears to be at his baseline. Feels speech may be ever so slightly slurred however also notes that patient has been getting less sleep then normal as he was preparing for his friends arrival. Repeat NIHSS of 2. Admitted to stroke for further work up.    T(C): 36.6 (10-08-23 @ 03:12), Max: 37.1 (10-08-23 @ 00:05)  HR: 88 (10-08-23 @ 03:11) (88 - 104)  BP: 153/74 (10-08-23 @ 03:11) (138/71 - 186/103)  RR: 18 (10-08-23 @ 03:11) (18 - 20)  SpO2: 93% (10-08-23 @ 03:11) (93% - 98%)    PAST MEDICAL & SURGICAL HISTORY:  Aneurysm      HTN (hypertension)      HLD (hyperlipidemia)      BPH (benign prostatic hyperplasia)      H/O shoulder surgery      H/O neck surgery          FAMILY HISTORY:  No pertinent family history in first degree relatives        SOCIAL HISTORY:   Patient lives with *** at ***.   Smoking status:  Drinking:  Drug Use:     ROS: ***  Constitutional: No fever, weight loss or fatigue  Eyes: No eye pain, visual disturbances, or discharge  ENMT:  No difficulty hearing, tinnitus; No sinus or throat pain  Neck: No pain or stiffness  Respiratory: No cough, wheezing, chills or hemoptysis  Cardiovascular: No chest pain, palpitations, shortness of breath, or leg swelling  Gastrointestinal: No abdominal pain. No nausea, vomiting or hematemesis; No diarrhea or constipation. Nohematochezia.  Genitourinary: No dysuria, frequency, hematuria or incontinence  Neurological: As per HPI  Skin: No itching, burning, rashes or lesions   Endocrine: No heat or cold intolerance; No hair loss  Musculoskeletal: No joint pain or swelling; No muscle, back or extremity pain  Heme/Lymph: No easy bruising or bleeding gums    MEDICATIONS  (STANDING):  aspirin enteric coated 81 milliGRAM(s) Oral daily  atorvastatin 80 milliGRAM(s) Oral at bedtime  finasteride 5 milliGRAM(s) Oral daily    MEDICATIONS  (PRN):    Allergies    No Known Allergies    Intolerances      Vital Signs Last 24 Hrs  T(C): 36.6 (08 Oct 2023 03:12), Max: 37.1 (08 Oct 2023 00:05)  T(F): 97.8 (08 Oct 2023 03:12), Max: 98.7 (08 Oct 2023 00:05)  HR: 88 (08 Oct 2023 03:11) (88 - 104)  BP: 153/74 (08 Oct 2023 03:11) (138/71 - 186/103)  BP(mean): 104 (08 Oct 2023 03:11) (104 - 104)  RR: 18 (08 Oct 2023 03:11) (18 - 20)  SpO2: 93% (08 Oct 2023 03:11) (93% - 98%)    Parameters below as of 08 Oct 2023 03:11  Patient On (Oxygen Delivery Method): room air        Physical exam:  Constitutional: No acute distress, conversant  Eyes: Anicteric sclerae, moist conjunctivae, see below for CNs  Neck: trachea midline, FROM, supple, no thyromegaly or lymphadenopathy  Cardiovascular: Regular rate and rhythm, no murmurs, rubs, or gallops. No carotid bruits.   Pulmonary: Anterior breath sounds clear bilaterally, no crackles or wheezing. No use of accessory muscles  GI: Abdomen soft, non-distended, non-tender  Extremities: Radial and DP pulses +2, no edema    Neurologic:  -Mental status: Awake, alert, oriented to person, place, and time. Speech is fluent with intact naming, repetition, and comprehension, no dysarthria. Recent and remote memory intact. Follows commands. Attention/concentration intact. Fund of knowledge appropriate.  -Cranial nerves:   II: Visual fields are full to confrontation.  III, IV, VI: Extraocular movements are intact without nystagmus. Pupils equally round and reactive to light  V:  Facial sensation V1-V3 equal and intact   VII: Face is symmetric with normal eye closure and smile  VIII: Hearing is bilaterally intact to finger rub  IX, X: Uvula is midline and soft palate rises symmetrically  XI: Head turning and shoulder shrug are intact.  XII: Tongue protrudes midline  Motor: Normal bulk and tone. No pronator drift. Strength bilateral upper extremity 5/5, bilateral lower extremities 5/5.  Rapid alternating movements intact and symmetric  Sensation: Intact to light touch bilaterally. No neglect or extinction on double simultaneous testing.  Coordination: No dysmetria on finger-to-nose and heel-to-shin bilaterally  Reflexes: Downgoing toes bilaterally   Gait: Narrow gait and steady    NIHSS: **** ASPECT Score: ***** ICH Score: ****** (GCS)    Fingerstick Blood Glucose: CAPILLARY BLOOD GLUCOSE  113 (08 Oct 2023 00:39)      POCT Blood Glucose.: 113 mg/dL (07 Oct 2023 23:58)    LABS:                        14.5   9.12  )-----------( 398      ( 08 Oct 2023 00:23 )             41.2     10    138  |  102  |  18  ----------------------------<  122<H>  4.4   |  24  |  0.84    Ca    10.6<H>      08 Oct 2023 00:23    TPro  8.0  /  Alb  4.5  /  TBili  0.2  /  DBili  x   /  AST  35  /  ALT  32  /  AlkPhos  76  10-08    PT/INR - ( 08 Oct 2023 00:23 )   PT: 11.9 sec;   INR: 1.05          PTT - ( 08 Oct 2023 00:23 )  PTT:27.3 sec      Urinalysis Basic - ( 08 Oct 2023 01:59 )    Color: Yellow / Appearance: Clear / S.015 / pH: x  Gluc: x / Ketone: NEGATIVE  / Bili: Negative / Urobili: 0.2 E.U./dL   Blood: x / Protein: 100 mg/dL / Nitrite: NEGATIVE   Leuk Esterase: Trace / RBC: < 5 /HPF / WBC < 5 /HPF   Sq Epi: x / Non Sq Epi: x / Bacteria: Present /HPF        RADIOLOGY & ADDITIONAL STUDIES:      -----------------------------------------------------------------------------------------------------------------  IV-tPA (Y/N):    ***                              Bolus time:    Alteplase Dose Verification w/ RN:  Reason IV-tPA not given: ***   78y Male with PMHx of HTN (has seen outpatient nephrology for resistant HTN w/u), HLD, erectile dysfunction, thoracic aortic aneurysm (recommended repeat CTA in 05/2023 by cardiologist however don't see results in HIE), urinary incontinence, insomnia, CAD  who presented to the ED for further evaluation of slurred speech and generalized weakness. Patient woke up in his US[facility] at 1 PM on 10/7 and went to dinner with his friends earlier in the evening. Friend at bedside, who's a retired physician, states that once they returned home from dinner at approx 10 PM patient was noted to have slurred speech and difficulty getting up out of his chair. Notes that patient did not ingest any alcohol or take any of his medications for sleep prior to the onset of his symptoms. Patient is independent at baseline, lives in a 2 story home, does not walk with any assistance. Friend noted that this was abnormal for the patient, prompting EMS to be called. Patient endorses feeling generally fatigued, didn't endorsed any weakness to one particular side of the body. Patient denies any focal numbness, tingling, dizziness, lightheadedness, headache, N/V, vision changes, neck pain. Upon arrival to the ED, patient with , HR in the low 100s to one teens, and slightly hypoxic sating 92-95% on RA with increased WOB. Stroke code was called. On initial examination, patient appeared to be somewhat altered with difficulty maintaining eye opening for extended periods however responsive to voice. Speech did not appear to be overtly slurred however friend at bedside said that he did not sound like himself. No expressive or receptive aphasia. B/l UEs 4-/5 without drift, able to maintain AG > 10 seconds. Sensation intact. Initial NIHSS of 6 (? dysarthria, R NLFF and b/l LEs with a drift, hit the bed < 5 seconds). Gave patient a camera to look at his face to see if flattening was his baseline, patient stated that he wasn't sure however patient's friend felt that it was. Notes that he does have a history of facioplasty and other surgeries on his face in the past. CTH, CTA H/N and CTP were unremarkable. Taken back to the ED and was noted to have an improvement in his BP to the 160s systolic. Given patient's encephalopathic appearance, recommended a toxic metabolic work up which was grossly unrevealing. Reevaluated patient again in the ED, BP improved to the 140s systolic. Noted to have an improvement in his exam, now able to hold both of his legs up antigravity > 5 seconds without a drift. Another one of patient's friends appeared at bedside. Agrees that patient's face appears to be at his baseline. Feels speech may be ever so slightly slurred however also notes that patient has been getting less sleep then normal as he was preparing for his friends arrival. Repeat NIHSS of 2. Admitted to stroke for further work up.

## 2023-10-08 NOTE — H&P ADULT - NSHPPHYSICALEXAM_GEN_ALL_CORE
Constitutional: Appears to be in no apparent distress, lethargic  Eyes: Anicteric sclerae, moist conjunctivae, see below for CNs  Extremities: No edema    Neurologic:  -Mental status: Awake however appeared to be somewhat lethargic, at times with difficulty maintaining eye opening however responsive to voice. Oriented to person, place, and time. No obvious slurred speech however friend at bedside states that he does not sound like himself. Intact naming, repetition, and comprehension. Follows commands. Attention/concentration intact. Fund of knowledge appropriate.  -Cranial nerves:   II: Visual fields are full to confrontation.  III, IV, VI: Extraocular movements are intact without nystagmus. Pupils equally round and reactive to light  V:  Facial sensation V1-V3 equal and intact   VII: Face is symmetric with normal eye closure and smile  VIII: Hearing is bilaterally intact to finger rub  IX, X: Uvula is midline and soft palate rises symmetrically  XI: Head turning and shoulder shrug are intact.  XII: Tongue protrudes midline  Motor: Normal bulk and tone. No pronator drift. Strength bilateral upper extremity 5/5, bilateral lower extremities 5/5.  Rapid alternating movements intact and symmetric  Sensation: Intact to light touch bilaterally. No neglect or extinction on double simultaneous testing.  Coordination: No dysmetria on finger-to-nose and heel-to-shin bilaterally  Reflexes: Downgoing toes bilaterally   Gait: Narrow gait and steady    NIHSS: **** ASPECT Score: ***** ICH Score: ****** (GCS) AT TIME OF STROKE CODE  Constitutional: Appears to be in no apparent distress, lethargic  Eyes: Anicteric sclerae, moist conjunctivae, see below for CNs  Extremities: No edema    Neurologic:  -Mental status: Awake however appeared to be somewhat lethargic, at times with difficulty maintaining eye opening however responsive to voice. Oriented to person, place, and time. No obvious slurred speech however friend at bedside states that he does not sound like himself. Intact naming, repetition, and comprehension. Follows commands. Attention/concentration intact. Fund of knowledge appropriate.  -Cranial nerves:   II: Visual fields are full to confrontation.  III, IV, VI: Extraocular movements are intact without nystagmus. Pupils equally round and reactive to light  V:  Facial sensation V1-V3 equal and intact   VII: R NLFF (baseline per friends at bedside). Normal eye closure  XII: Tongue protrudes midline  Motor: Normal bulk and tone. B/l UEs at least a 3+ vs 4-/5 - able to maintain AG > 10 sec without a drift. B/l LEs 3/5 with drift - unable to sustain antigravity > 5 seconds, drifts immediately to the bed.  Sensation: Intact to light touch bilaterally. No neglect or extinction on double simultaneous testing.  Coordination: Difficulty obtaining 2/2 to patient's complaints of weakness  Gait: Deferred    NIHSS: 6

## 2023-10-08 NOTE — ED ADULT NURSE REASSESSMENT NOTE - NS ED NURSE REASSESS COMMENT FT1
patient arrived back from CT scan and placed in room with continuos monitoring. Pt placed on MARTI Jarrett made aware.

## 2023-10-08 NOTE — ED PROVIDER NOTE - CLINICAL SUMMARY MEDICAL DECISION MAKING FREE TEXT BOX
79 yo m presenting with slurred speech, weakness, last known well 10 pm, FS wnl. Concern for possible CVA, stroke code activated. will obtain ct imaging, labs, reassess.

## 2023-10-08 NOTE — CONSULT NOTE ADULT - SUBJECTIVE AND OBJECTIVE BOX
KAROLYN HERNANDEZ  78y  Male    HPI per stroke: 78y Male with PMHx of HTN (has seen outpatient nephrology for resistant HTN w/u), HLD, erectile dysfunction, thoracic aortic aneurysm (recommended repeat CTA in 05/2023 by cardiologist however don't see results in HIE), urinary incontinence, insomnia, CAD  who presented to the ED for further evaluation of slurred speech and generalized weakness. Patient woke up in his USOH at 1 PM on 10/7 and went to dinner with his friends earlier in the evening. Friend at bedside, who's a retired physician, states that once they returned home from dinner at approx 10 PM patient was noted to have slurred speech and difficulty getting up out of his chair. Notes that patient did not ingest any alcohol or take any of his medications for sleep prior to the onset of his symptoms. Patient is independent at baseline, lives in a 2 story home, does not walk with any assistance. Friend noted that this was abnormal for the patient, prompting EMS to be called. Patient endorses feeling generally fatigued, didn't endorsed any weakness to one particular side of the body. Patient denies any focal numbness, tingling, dizziness, lightheadedness, headache, N/V, vision changes, neck pain. Upon arrival to the ED, patient with , HR in the low 100s to one teens, and slightly hypoxic sating 92-95% on RA with increased WOB. Stroke code was called. On initial examination, patient appeared to be somewhat altered with difficulty maintaining eye opening for extended periods however responsive to voice. Speech did not appear to be overtly slurred however friend at bedside said that he did not sound like himself. No expressive or receptive aphasia. B/l UEs 4-/5 without drift, able to maintain AG > 10 seconds. Sensation intact. Initial NIHSS of 6 (? dysarthria, R NLFF and b/l LEs with a drift, hit the bed < 5 seconds). Gave patient a camera to look at his face to see if flattening was his baseline, patient stated that he wasn't sure however patient's friend felt that it was. Notes that he does have a history of facioplasty and other surgeries on his face in the past. CTH, CTA H/N and CTP were unremarkable. Taken back to the ED and was noted to have an improvement in his BP to the 160s systolic. Given patient's encephalopathic appearance, recommended a toxic metabolic work up which was grossly unrevealing. Reevaluated patient again in the ED, BP improved to the 140s systolic. Noted to have an improvement in his exam, now able to hold both of his legs up antigravity > 5 seconds without a drift. Another one of patient's friends appeared at bedside. Agrees that patient's face appears to be at his baseline. Feels speech may be ever so slightly slurred however also notes that patient has been getting less sleep then normal as he was preparing for his friends arrival. Repeat NIHSS of 2. Admitted to stroke for further work up.      PAST MEDICAL/SURGICAL HISTORY  PAST MEDICAL & SURGICAL HISTORY:  Aneurysm      HTN (hypertension)      HLD (hyperlipidemia)      BPH (benign prostatic hyperplasia)      Insomnia      H/O shoulder surgery      H/O neck surgery          REVIEW OF SYSTEMS:  CONSTITUTIONAL: No fever, weight loss, or fatigue  EYES: No eye pain, visual disturbances, or discharge  ENMT:  No difficulty hearing, tinnitus, vertigo; No sinus or throat pain  NECK: No pain or stiffness  BREASTS: No pain, masses, or nipple discharge  RESPIRATORY: No cough, wheezing, chills or hemoptysis; No shortness of breath  CARDIOVASCULAR: No chest pain, palpitations, dizziness, or leg swelling  GASTROINTESTINAL: No abdominal or epigastric pain. No nausea, vomiting, or hematemesis; No diarrhea or constipation. No melena or hematochezia.  GENITOURINARY: No dysuria, frequency, hematuria, or incontinence  NEUROLOGICAL: No headaches, memory loss, loss of strength, numbness, or tremors  SKIN: No itching, burning, rashes, or lesions   LYMPH NODES: No enlarged glands  ENDOCRINE: No heat or cold intolerance; No hair loss  MUSCULOSKELETAL: No joint pain or swelling; No muscle, back, or extremity pain  PSYCHIATRIC: No depression, anxiety, mood swings, or difficulty sleeping  HEME/LYMPH: No easy bruising, or bleeding gums  ALLERY AND IMMUNOLOGIC: No hives or eczema    T(C): 37.1 (10-08-23 @ 05:52), Max: 37.1 (10-08-23 @ 00:05)  HR: 80 (10-08-23 @ 08:42) (80 - 104)  BP: 107/60 (10-08-23 @ 08:42) (102/57 - 186/103)  RR: 18 (10-08-23 @ 06:43) (17 - 20)  SpO2: 92% (10-08-23 @ 08:42) (92% - 98%)  Wt(kg): --Vital Signs Last 24 Hrs  T(C): 37.1 (08 Oct 2023 05:52), Max: 37.1 (08 Oct 2023 00:05)  T(F): 98.8 (08 Oct 2023 05:52), Max: 98.8 (08 Oct 2023 05:52)  HR: 80 (08 Oct 2023 08:42) (80 - 104)  BP: 107/60 (08 Oct 2023 08:42) (102/57 - 186/103)  BP(mean): 78 (08 Oct 2023 08:42) (71 - 104)  RR: 18 (08 Oct 2023 06:43) (17 - 20)  SpO2: 92% (08 Oct 2023 08:42) (92% - 98%)    Parameters below as of 08 Oct 2023 08:42  Patient On (Oxygen Delivery Method): nasal cannula  O2 Flow (L/min): 2      PHYSICAL EXAM:  GENERAL: NAD, well-groomed, well-developed  HEAD:  Atraumatic, Normocephalic  EYES: EOMI, PERRLA, conjunctiva and sclera clear  ENMT: No tonsillar erythema, exudates, or enlargement; Moist mucous membranes, Good dentition, No lesions  NECK: Supple, No JVD, Normal thyroid  NERVOUS SYSTEM:  Alert & Oriented X3, Good concentration; Motor Strength 5/5 B/L upper and lower extremities; DTRs 2+ intact and symmetric  CHEST/LUNG: Clear to percussion bilaterally; No rales, rhonchi, wheezing, or rubs  HEART: Regular rate and rhythm; No murmurs, rubs, or gallops  ABDOMEN: Soft, Nontender, Nondistended; Bowel sounds present  EXTREMITIES:  2+ Peripheral Pulses, No clubbing, cyanosis, or edema  LYMPH: No lymphadenopathy noted  SKIN: No rashes or lesions    Consultant(s) Notes Reviewed:  [x ] YES  [ ] NO  Care Discussed with Consultants/Other Providers [ x] YES  [ ] NO    LABS:  CBC   10-08-23 @ 00:23  Hematcorit 41.2  Hemoglobin 14.5  Mean Cell Hemoglobin 31.7  Platelet Count-Automated 398  RBC Count 4.58  Red Cell Distrib Width 12.5  Wbc Count 9.12      BMP  10-08-23 @ 00:23  Anion Gap. Serum 12  Blood Urea Nitrogen,Serm 18  Calcium, Total Serum 10.6  Carbon Dioxide, Serum 24  Chloride, Serum 102  Creatinine, Serum 0.84  eGFR in  --  eGFR in Non Afican American --  Gloucose, serum 122  Potassium, Serum 4.4  Sodium, Serum 138                  CMP  10-08-23 @ 00:23  Nickie Aminotransferase(ALT/SGPT)32  Albumin, Serum 4.5  Alkaline Phosphatase, Serum 76  Anion Gap, Serum 12  Aspartate Aminotransferase (AST/SGOT)35  Bilirubin Total, Serum 0.2  Blood Urea Nitrogen, Serum 18  Calcium,Total Serum 10.6  Carbon Dioxide, Serum 24  Chloride, Serum 102  Creatinine, Serum 0.84  eGFR if  --  eGFR if Non African American --  Glucose, Serum 122  Potassium, Serum 4.4  Protein Total, Serum 8.0  Sodium, Serum 138                          PT/INR  PT/INR  10-08-23 @ 00:23  INR 1.05  Prothrombin Time Comment --  Prothrobin Time, [id number]      Amylase/Lipase            RADIOLOGY & ADDITIONAL TESTS:    Imaging Personally Reviewed:  [ ] YES  [ ] NO KAROLYN HERNANDEZ  78y  Male    HPI per stroke: 78y Male with PMHx of HTN (has seen outpatient nephrology for resistant HTN w/u), HLD, erectile dysfunction, thoracic aortic aneurysm (recommended repeat CTA in 05/2023 by cardiologist however don't see results in HIE), urinary incontinence, insomnia, CAD  who presented to the ED for further evaluation of slurred speech and generalized weakness. Patient woke up in his USOH at 1 PM on 10/7 and went to dinner with his friends earlier in the evening. Friend at bedside, who's a retired physician, states that once they returned home from dinner at approx 10 PM patient was noted to have slurred speech and difficulty getting up out of his chair. Notes that patient did not ingest any alcohol or take any of his medications for sleep prior to the onset of his symptoms. Patient is independent at baseline, lives in a 2 story home, does not walk with any assistance. Friend noted that this was abnormal for the patient, prompting EMS to be called. Patient endorses feeling generally fatigued, didn't endorsed any weakness to one particular side of the body. Patient denies any focal numbness, tingling, dizziness, lightheadedness, headache, N/V, vision changes, neck pain. Upon arrival to the ED, patient with , HR in the low 100s to one teens, and slightly hypoxic sating 92-95% on RA with increased WOB. Stroke code was called. On initial examination, patient appeared to be somewhat altered with difficulty maintaining eye opening for extended periods however responsive to voice. Speech did not appear to be overtly slurred however friend at bedside said that he did not sound like himself. No expressive or receptive aphasia. B/l UEs 4-/5 without drift, able to maintain AG > 10 seconds. Sensation intact. Initial NIHSS of 6 (? dysarthria, R NLFF and b/l LEs with a drift, hit the bed < 5 seconds). Gave patient a camera to look at his face to see if flattening was his baseline, patient stated that he wasn't sure however patient's friend felt that it was. Notes that he does have a history of facioplasty and other surgeries on his face in the past. CTH, CTA H/N and CTP were unremarkable. Taken back to the ED and was noted to have an improvement in his BP to the 160s systolic. Given patient's encephalopathic appearance, recommended a toxic metabolic work up which was grossly unrevealing. Reevaluated patient again in the ED, BP improved to the 140s systolic. Noted to have an improvement in his exam, now able to hold both of his legs up antigravity > 5 seconds without a drift. Another one of patient's friends appeared at bedside. Agrees that patient's face appears to be at his baseline. Feels speech may be ever so slightly slurred however also notes that patient has been getting less sleep then normal as he was preparing for his friends arrival. Repeat NIHSS of 2. Admitted to stroke for further work up.    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 Says that is when his fatigue started. Also says this AM he choked on his food.       PAST MEDICAL/SURGICAL HISTORY  PAST MEDICAL & SURGICAL HISTORY:  Aneurysm      HTN (hypertension)      HLD (hyperlipidemia)      BPH (benign prostatic hyperplasia)      Insomnia      H/O shoulder surgery      H/O neck surgery          T(C): 37.1 (10-08-23 @ 05:52), Max: 37.1 (10-08-23 @ 00:05)  HR: 80 (10-08-23 @ 08:42) (80 - 104)  BP: 107/60 (10-08-23 @ 08:42) (102/57 - 186/103)  RR: 18 (10-08-23 @ 06:43) (17 - 20)  SpO2: 92% (10-08-23 @ 08:42) (92% - 98%)  Wt(kg): --Vital Signs Last 24 Hrs  T(C): 37.1 (08 Oct 2023 05:52), Max: 37.1 (08 Oct 2023 00:05)  T(F): 98.8 (08 Oct 2023 05:52), Max: 98.8 (08 Oct 2023 05:52)  HR: 80 (08 Oct 2023 08:42) (80 - 104)  BP: 107/60 (08 Oct 2023 08:42) (102/57 - 186/103)  BP(mean): 78 (08 Oct 2023 08:42) (71 - 104)  RR: 18 (08 Oct 2023 06:43) (17 - 20)  SpO2: 92% (08 Oct 2023 08:42) (92% - 98%)    Parameters below as of 08 Oct 2023 08:42  Patient On (Oxygen Delivery Method): nasal cannula  O2 Flow (L/min): 2      PHYSICAL EXAM:  GENERAL: tan, nad  HEAD:  Atraumatic, Normocephalic  EYES: conjunctiva and sclera clear  ENMT: obvious evidence of cosmetic procedures to face/ lips  NECK: Supple  NERVOUS SYSTEM:  Alert & Oriented X3, speech clear, no focal deficits  CHEST/LUNG: RLL rhonchi  HEART: Regular rate and rhythm; No murmurs, rubs, or gallops  ABDOMEN: Soft, Nontender, Nondistended; Bowel sounds present  EXTREMITIES:  2+ Peripheral Pulses, No clubbing, cyanosis, or edema  LYMPH: No lymphadenopathy noted  SKIN: No rashes or lesions    Consultant(s) Notes Reviewed:  [x ] YES  [ ] NO  Care Discussed with Consultants/Other Providers [ x] YES  [ ] NO    LABS:  CBC   10-08-23 @ 00:23  Hematcorit 41.2  Hemoglobin 14.5  Mean Cell Hemoglobin 31.7  Platelet Count-Automated 398  RBC Count 4.58  Red Cell Distrib Width 12.5  Wbc Count 9.12      BMP  10-08-23 @ 00:23  Anion Gap. Serum 12  Blood Urea Nitrogen,Serm 18  Calcium, Total Serum 10.6  Carbon Dioxide, Serum 24  Chloride, Serum 102  Creatinine, Serum 0.84  eGFR in  --  eGFR in Non Afican American --  Gloucose, serum 122  Potassium, Serum 4.4  Sodium, Serum 138                  CMP  10-08-23 @ 00:23  Nickie Aminotransferase(ALT/SGPT)32  Albumin, Serum 4.5  Alkaline Phosphatase, Serum 76  Anion Gap, Serum 12  Aspartate Aminotransferase (AST/SGOT)35  Bilirubin Total, Serum 0.2  Blood Urea Nitrogen, Serum 18  Calcium,Total Serum 10.6  Carbon Dioxide, Serum 24  Chloride, Serum 102  Creatinine, Serum 0.84  eGFR if  --  eGFR if Non African American --  Glucose, Serum 122  Potassium, Serum 4.4  Protein Total, Serum 8.0  Sodium, Serum 138                          PT/INR  PT/INR  10-08-23 @ 00:23  INR 1.05  Prothrombin Time Comment --  Prothrobin Time, [id number]      Amylase/Lipase            RADIOLOGY & ADDITIONAL TESTS:    Imaging Personally Reviewed:  [ ] YES  [ ] NO

## 2023-10-08 NOTE — PATIENT PROFILE ADULT - FUNCTIONAL ASSESSMENT - DAILY ACTIVITY 1.
Detail Level: Generalized
Detail Level: Detailed
Detail Level: Simple
4 = No assist / stand by assistance

## 2023-10-08 NOTE — PATIENT PROFILE ADULT - FALL HARM RISK - HARM RISK INTERVENTIONS

## 2023-10-08 NOTE — H&P ADULT - NSHPLABSRESULTS_GEN_ALL_CORE
Fingerstick Blood Glucose: CAPILLARY BLOOD GLUCOSE  113 (08 Oct 2023 00:39)      POCT Blood Glucose.: 113 mg/dL (07 Oct 2023 23:58)    LABS:                        14.5   9.12  )-----------( 398      ( 08 Oct 2023 00:23 )             41.2     10    138  |  102  |  18  ----------------------------<  122<H>  4.4   |  24  |  0.84    Ca    10.6<H>      08 Oct 2023 00:23    TPro  8.0  /  Alb  4.5  /  TBili  0.2  /  DBili  x   /  AST  35  /  ALT  32  /  AlkPhos  76  1008    PT/INR - ( 08 Oct 2023 00:23 )   PT: 11.9 sec;   INR: 1.05          PTT - ( 08 Oct 2023 00:23 )  PTT:27.3 sec      Urinalysis Basic - ( 08 Oct 2023 01:59 )    Color: Yellow / Appearance: Clear / S.015 / pH: x  Gluc: x / Ketone: NEGATIVE  / Bili: Negative / Urobili: 0.2 E.U./dL   Blood: x / Protein: 100 mg/dL / Nitrite: NEGATIVE   Leuk Esterase: Trace / RBC: < 5 /HPF / WBC < 5 /HPF   Sq Epi: x / Non Sq Epi: x / Bacteria: Present /HPF        RADIOLOGY & ADDITIONAL STUDIES:  < from: CT Brain Stroke Protocol (10.08.23 @ 00:20) >    IMPRESSION: No acute intracranialhemorrhage, mass effect, or demarcated   territorial infarct.    < end of copied text >    < from: CT Angio Brain Stroke Protocol  w/ IV Cont (10.08.23 @ 00:21) >    IMPRESSION:  No high-grade arterial stenosis or occlusion.    < end of copied text >    < from: CT Brain Perfusion Maps Stroke (10.08.23 @ 00:20) >    IMPRESSION: No evidence of CBF less than 30% or Tmax greater than 6   seconds.    < end of copied text >    < from: MR Head No Cont (10.08.23 @ 05:04) >    IMPRESSION:  No acute infarct.    < end of copied text >    -----------------------------------------------------------------------------------------------------------------  IV-tPA (Y/N):   N  Reason IV-tPA not given: concern for potential infectious etiology, nonlocalizing symptoms

## 2023-10-08 NOTE — ED PROVIDER NOTE - NS ED ROS FT
Constitutional: No fever or chills  Eyes: No discharge or drainage  Ears, Nose, Mouth, Throat: No nasal discharge, no sore throat  Cardiovascular: No chest pain, no palpitations  Respiratory: No shortness of breath, no cough  Gastrointestinal: No nausea or vomiting, no abdominal pain, no diarrhea or constipation  Musculoskeletal: No joint pain, no swelling  Skin: No rashes or lesions  Neurological: No numbness, + weakness, no  tingling, no headache, + slurred speech  Psychiatric: No depression

## 2023-10-08 NOTE — ED ADULT NURSE NOTE - NSICDXPASTMEDICALHX_GEN_ALL_CORE_FT
PAST MEDICAL HISTORY:  Aneurysm     BPH (benign prostatic hyperplasia)     HLD (hyperlipidemia)     HTN (hypertension)

## 2023-10-08 NOTE — ED PROVIDER NOTE - PHYSICAL EXAMINATION
general: Well appearing, in no acute distress  HEENT: Normocephalic, atraumatic, extraocular movements intact  CV: Regular rate  Pulm: No respiratory distress, no tachypnea  Abd: Flat, no gross distension  Ext: warm and well perfused  Skin: No gross rashes or lesions  Neuro: Alert and oriented, lower extremities weak on exam, L>R, L arm slightly weaker than R, sensation intact, + dysarthria

## 2023-10-08 NOTE — ED ADULT NURSE NOTE - NSFALLRISKINTERV_ED_ALL_ED

## 2023-10-08 NOTE — H&P ADULT - NSHPREVIEWOFSYSTEMS_GEN_ALL_CORE
Eyes: No eye pain, visual disturbances, or discharge  Eyes: No eye pain, visual disturbances, or discharge  Constitutional: No fever, weight loss or fatigue  Eyes: No eye pain or discharge  ENMT:  No difficulty hearing, tinnitus; No sinus or throat pain  Neck: No pain or stiffness  Respiratory: No cough, wheezing, chills or hemoptysis  Cardiovascular: No chest pain, palpitations, shortness of breath, or leg swelling  Gastrointestinal: No abdominal pain. No hematemesis; No diarrhea or constipation. No hematochezia.  Genitourinary: No dysuria, frequency, hematuria or incontinence  Neurological: As per HPI  Skin: No itching, burning, rashes or lesions   Endocrine: No heat or cold intolerance; No hair loss  Musculoskeletal: No joint pain or swelling; No muscle, back or extremity pain  Heme/Lymph: No easy bruising or bleeding gums  ENMT:  No difficulty hearing, tinnitus; No sinus or throat pain  Neck: No pain or stiffness  Respiratory: No cough, wheezing, chills or hemoptysis  Cardiovascular: No chest pain, palpitations, shortness of breath, or leg swelling  Gastrointestinal: No abdominal pain. No nausea, vomiting or hematemesis; No diarrhea or constipation. No hematochezia.  Genitourinary: No dysuria, frequency, hematuria or incontinence  Neurological: As per HPI  Skin: No itching, burning, rashes or lesions   Endocrine: No heat or cold intolerance; No hair loss  Musculoskeletal: No joint pain or swelling; No muscle, back or extremity pain  Heme/Lymph: No easy bruising or bleeding gums

## 2023-10-08 NOTE — CHART NOTE - NSCHARTNOTEFT_GEN_A_CORE
Transfer note stroke tele to regional medicine:    78y Male with PMHx of HTN (has seen outpatient nephrology for resistant HTN w/u), HLD, erectile dysfunction, thoracic aortic aneurysm (recommended repeat CTA in 05/2023 by cardiologist however don't see results in HIE), urinary incontinence, insomnia, CAD  who presented to the ED for further evaluation of slurred speech and generalized weakness. Pt states he woke up at 1 pm on 10/7 in his usual state of health, and went to dinner with his friend in the evening. Once they returned home, friend states that pt was noted to have slurred speech and difficulty getting up out of his chair around 10 pm that night. Pt states that he did not have any alcohol or take any new medications. Pt is independent at baseline. EMS was called, and pt endorsed feeling generalized weakness with no unilateral weakness. Stroke code called in the ED for slurred speech. On initial exam, pt lethargic, had bilateral leg weakness and overall weakness, had questionable right facial droop (though at baseline per patient via mirror, notes he has a history of facioplasty). Initial Transfer note stroke tele to [facility]:    78y Male with PMHx of HTN (has seen outpatient nephrology for resistant HTN w/u), HLD, erectile dysfunction, thoracic aortic aneurysm (recommended repeat CTA in 05/2023 by cardiologist however don't see results in HIE), urinary incontinence, insomnia, CAD  who presented to the ED for further evaluation of slurred speech and generalized weakness. Pt states he woke up at 1 pm on 10/7 in his usual state of health, and went to dinner with his friend in the evening. Once they returned home, friend states that pt was noted to have slurred speech and difficulty getting up out of his chair around 10 pm that night. Pt states that he did not have any alcohol or take any new medications. Pt is independent at baseline. EMS was called, and pt endorsed feeling generalized weakness with no unilateral weakness. Stroke code called in the ED for slurred speech. On initial exam, pt lethargic, had bilateral leg weakness and overall weakness, had questionable right facial droop (though at baseline per patient via mirror, notes he has a history of facioplasty).     Initial vitals in the ER showed elevated BP to 180s, HR in the 100s, satting 92% on room air, with increased work of breathing.  UA negative, CXR unrevealing.    Pt had MR brain done overnight, showed no acute stroke. MRI T and C spine done as well showing no cord compression.    On 10/8 on exam, pt lethargic and clammy. Rectal temperature taken was 100.8. O2 sat 90-91% when put on room air. Tylenol ordered, RVP sent, and blood cultures drawn. Given fever and relative hypoxia with no evidence of stroke on MRI, pt transferred to regional medicine    Discussed with Neurology fellow Dr. Slade and Attending Dr. Shahid Transfer note stroke tele to [facility]:    78y Male with PMHx of HTN (has seen outpatient nephrology for resistant HTN w/u), HLD, erectile dysfunction, thoracic aortic aneurysm (recommended repeat CTA in 05/2023 by cardiologist however don't see results in HIE), urinary incontinence, insomnia, CAD  who presented to the ED for further evaluation of slurred speech and generalized weakness. Pt states he woke up at 1 pm on 10/7 in his usual state of health, and went to dinner with his friend in the evening. Once they returned home, friend states that pt was noted to have slurred speech and difficulty getting up out of his chair around 10 pm that night. Pt states that he did not have any alcohol or take any new medications. Pt is independent at baseline. EMS was called, and pt endorsed feeling generalized weakness with no unilateral weakness. Stroke code called in the ED for slurred speech. On initial exam, pt lethargic, had bilateral leg weakness and overall weakness, had questionable right facial droop (though at baseline per patient via mirror, notes he has a history of facioplasty).     Initial vitals in the ER showed elevated BP to 180s, HR in the 100s, satting 92% on room air, with increased work of breathing.  UA negative, CXR unrevealing.    Pt had MR brain done overnight, showed no acute stroke. MRI T and C spine done as well showing no cord compression.    On 10/8 on exam, pt lethargic and clammy, +asterixis. Rectal temperature taken was 100.8. O2 sat 90-91% when put on room air. Tylenol ordered, RVP sent, and blood cultures drawn. Given fever and relative hypoxia with no evidence of stroke on MRI, pt transferred to regional medicine    Discussed with Neurology fellow Dr. Slade and Attending Dr. Shahid

## 2023-10-08 NOTE — H&P ADULT - NSICDXPASTMEDICALHX_GEN_ALL_CORE_FT
PAST MEDICAL HISTORY:  Aneurysm     BPH (benign prostatic hyperplasia)     HLD (hyperlipidemia)     HTN (hypertension)     Insomnia

## 2023-10-08 NOTE — ED PROVIDER NOTE - OBJECTIVE STATEMENT
79 yo m with history of htn, hld, dm presenting with slurred speech. Last known well 10 PM. Pt was at dinner with friend who is a retired physician. Pt was noted to be sitting down in hotel room at 10 PM with slurred speech, full body weakness and some intermittent confusion. No headache or blurry vision, no cp or sob, no abd pain, no numbness, tingling. Denies alcohol use. ROS as above.

## 2023-10-08 NOTE — ED ADULT NURSE NOTE - OBJECTIVE STATEMENT
Patient arrived to the ER BIBEMS for slurred speech and full body weakness. Pt arrived tot he ER with a friend and EMS states LWK was 10pm. Pt at this moment denies any other medical complaints. Pt is noted to be awake, alert, in no acute distress, having nonlabored breathing, no retractions noted, non diaphoretic, and able to talk in clear full sentences. Pt upon arrival stroke code was called @2358 and pt went to CT scan immediately after. IV access was established and labs were sent. MD white.

## 2023-10-08 NOTE — H&P ADULT - ASSESSMENT
78y Male with PMHx of HTN (has seen outpatient nephrology for resistant HTN w/u), HLD, erectile dysfunction, thoracic aortic aneurysm (recommended repeat CTA in 05/2023 by cardiologist however don't see results in HIE), urinary incontinence, insomnia, CAD  who presented to the ED for further evaluation of slurred speech and generalized weakness. LKW 10PM 10/07. EMS was called by patient's friend who's a retired physician after he was unable to get out of his chair. Stroke code was called upon arrival to the ED. Upon arrival to the ED, patient with , HR in the low 100s to one teens, and slightly hypoxic sating 92-95% on RA with increased WOB. Initial NIHSS of 6. CTs negative for any acute pathology. Given nonlocalizing symptoms (R NLFF appears to be chronic per friends at bedside) and AMS, initially recommended toxic metabolic w/u. Grossly negative for acute pathology therefore was admitted to stroke for further w/u.     Neuro  #CVA workup  - start aspirin 81mg PO QD  - start atorvastatin 80mg daily  - q4hr stroke neuro checks and vitals  - MRI brain w/o contrast: negative for acute stroke  - f/u official MRI C and T spine results  - f/u UTox  - Stroke Code HCT Results:   - Stroke Code CTA Results:  - Stroke education    Cards  #HTN  - permissive hypertension, Goal -180  - hold home blood pressure medication for now  - Stroke Code EKG Results:  · Rate: 105   · Interpretation: sinus tachycardia, LAD, LBBB, similar to ekg dated 2016     #HLD  - high dose statin as above in CVA  - LDL results: pending    #hx thoracic aortic aneurysm   - last seen by outpatient cards in 05/2023  - recommended rpt CTA chest to assess size, no study seen in HIE   - consider CTS consult    Pulm  - call provider if SPO2 < 94%    #hypoxia  - tachycardic and hypoxic with increased WOB @ time of stroke code  - elevated CO2 on VBG  - BP coming down to the low 100-110s systolic  - can consider CT PE if pressure continues to drop    GI  #Nutrition/Fluids/Electrolytes   - replete K<4 and Mg <2  - Diet: DASH/TLC   - IVF: none    Renal  - daily BMPs    Infectious Disease  - afebrile on admission, no leukocytosis  - urine and CXR negative    Endocrine  #DM  - A1C results: pending    - TSH results: pending    DVT Prophylaxis  - SCDs for DVT prophylaxis   - SQL pending weight    Dispo: pending PT/OT     Discussed daily hospital plans and goals with patient and friends at bedside    Discussed with Stroke Fellow, Dr. Slade and Neurology Attending. Dr. Shahid

## 2023-10-09 ENCOUNTER — TRANSCRIPTION ENCOUNTER (OUTPATIENT)
Age: 78
End: 2023-10-09

## 2023-10-09 VITALS
HEART RATE: 87 BPM | TEMPERATURE: 98 F | SYSTOLIC BLOOD PRESSURE: 133 MMHG | RESPIRATION RATE: 18 BRPM | DIASTOLIC BLOOD PRESSURE: 79 MMHG | OXYGEN SATURATION: 96 %

## 2023-10-09 DIAGNOSIS — J69.0 PNEUMONITIS DUE TO INHALATION OF FOOD AND VOMIT: ICD-10-CM

## 2023-10-09 DIAGNOSIS — G51.0 BELL'S PALSY: ICD-10-CM

## 2023-10-09 DIAGNOSIS — E78.5 HYPERLIPIDEMIA, UNSPECIFIED: ICD-10-CM

## 2023-10-09 DIAGNOSIS — R32 UNSPECIFIED URINARY INCONTINENCE: ICD-10-CM

## 2023-10-09 DIAGNOSIS — I10 ESSENTIAL (PRIMARY) HYPERTENSION: ICD-10-CM

## 2023-10-09 DIAGNOSIS — I25.10 ATHEROSCLEROTIC HEART DISEASE OF NATIVE CORONARY ARTERY WITHOUT ANGINA PECTORIS: ICD-10-CM

## 2023-10-09 DIAGNOSIS — I71.20 THORACIC AORTIC ANEURYSM, WITHOUT RUPTURE, UNSPECIFIED: ICD-10-CM

## 2023-10-09 LAB
ALBUMIN SERPL ELPH-MCNC: 3.6 G/DL — SIGNIFICANT CHANGE UP (ref 3.3–5)
ALP SERPL-CCNC: 67 U/L — SIGNIFICANT CHANGE UP (ref 40–120)
ALT FLD-CCNC: 24 U/L — SIGNIFICANT CHANGE UP (ref 10–45)
AMPHET UR-MCNC: NEGATIVE — SIGNIFICANT CHANGE UP
ANION GAP SERPL CALC-SCNC: 7 MMOL/L — SIGNIFICANT CHANGE UP (ref 5–17)
AST SERPL-CCNC: 21 U/L — SIGNIFICANT CHANGE UP (ref 10–40)
BARBITURATES UR SCN-MCNC: NEGATIVE — SIGNIFICANT CHANGE UP
BENZODIAZ UR-MCNC: NEGATIVE — SIGNIFICANT CHANGE UP
BILIRUB SERPL-MCNC: <0.2 MG/DL — SIGNIFICANT CHANGE UP (ref 0.2–1.2)
BUN SERPL-MCNC: 18 MG/DL — SIGNIFICANT CHANGE UP (ref 7–23)
CALCIUM SERPL-MCNC: 9.6 MG/DL — SIGNIFICANT CHANGE UP (ref 8.4–10.5)
CHLORIDE SERPL-SCNC: 109 MMOL/L — HIGH (ref 96–108)
CO2 SERPL-SCNC: 27 MMOL/L — SIGNIFICANT CHANGE UP (ref 22–31)
COCAINE METAB.OTHER UR-MCNC: NEGATIVE — SIGNIFICANT CHANGE UP
CREAT SERPL-MCNC: 0.83 MG/DL — SIGNIFICANT CHANGE UP (ref 0.5–1.3)
EGFR: 90 ML/MIN/1.73M2 — SIGNIFICANT CHANGE UP
GLUCOSE BLDC GLUCOMTR-MCNC: 110 MG/DL — HIGH (ref 70–99)
GLUCOSE BLDC GLUCOMTR-MCNC: 95 MG/DL — SIGNIFICANT CHANGE UP (ref 70–99)
GLUCOSE SERPL-MCNC: 114 MG/DL — HIGH (ref 70–99)
HCT VFR BLD CALC: 35.3 % — LOW (ref 39–50)
HGB BLD-MCNC: 12.2 G/DL — LOW (ref 13–17)
MAGNESIUM SERPL-MCNC: 2 MG/DL — SIGNIFICANT CHANGE UP (ref 1.6–2.6)
MCHC RBC-ENTMCNC: 31.5 PG — SIGNIFICANT CHANGE UP (ref 27–34)
MCHC RBC-ENTMCNC: 34.6 GM/DL — SIGNIFICANT CHANGE UP (ref 32–36)
MCV RBC AUTO: 91.2 FL — SIGNIFICANT CHANGE UP (ref 80–100)
METHADONE UR-MCNC: NEGATIVE — SIGNIFICANT CHANGE UP
NRBC # BLD: 0 /100 WBCS — SIGNIFICANT CHANGE UP (ref 0–0)
OPIATES UR-MCNC: NEGATIVE — SIGNIFICANT CHANGE UP
PCP SPEC-MCNC: SIGNIFICANT CHANGE UP
PCP UR-MCNC: NEGATIVE — SIGNIFICANT CHANGE UP
PHOSPHATE SERPL-MCNC: 4.3 MG/DL — SIGNIFICANT CHANGE UP (ref 2.5–4.5)
PLATELET # BLD AUTO: 302 K/UL — SIGNIFICANT CHANGE UP (ref 150–400)
POTASSIUM SERPL-MCNC: 4 MMOL/L — SIGNIFICANT CHANGE UP (ref 3.5–5.3)
POTASSIUM SERPL-SCNC: 4 MMOL/L — SIGNIFICANT CHANGE UP (ref 3.5–5.3)
PROT SERPL-MCNC: 6.2 G/DL — SIGNIFICANT CHANGE UP (ref 6–8.3)
RBC # BLD: 3.87 M/UL — LOW (ref 4.2–5.8)
RBC # FLD: 12.6 % — SIGNIFICANT CHANGE UP (ref 10.3–14.5)
SODIUM SERPL-SCNC: 143 MMOL/L — SIGNIFICANT CHANGE UP (ref 135–145)
THC UR QL: POSITIVE
WBC # BLD: 6.11 K/UL — SIGNIFICANT CHANGE UP (ref 3.8–10.5)
WBC # FLD AUTO: 6.11 K/UL — SIGNIFICANT CHANGE UP (ref 3.8–10.5)

## 2023-10-09 PROCEDURE — 70450 CT HEAD/BRAIN W/O DYE: CPT | Mod: MA

## 2023-10-09 PROCEDURE — 93005 ELECTROCARDIOGRAM TRACING: CPT

## 2023-10-09 PROCEDURE — 80061 LIPID PANEL: CPT

## 2023-10-09 PROCEDURE — 70551 MRI BRAIN STEM W/O DYE: CPT | Mod: MC

## 2023-10-09 PROCEDURE — 84484 ASSAY OF TROPONIN QUANT: CPT

## 2023-10-09 PROCEDURE — 84100 ASSAY OF PHOSPHORUS: CPT

## 2023-10-09 PROCEDURE — 82803 BLOOD GASES ANY COMBINATION: CPT

## 2023-10-09 PROCEDURE — 83735 ASSAY OF MAGNESIUM: CPT

## 2023-10-09 PROCEDURE — 36415 COLL VENOUS BLD VENIPUNCTURE: CPT

## 2023-10-09 PROCEDURE — 71045 X-RAY EXAM CHEST 1 VIEW: CPT

## 2023-10-09 PROCEDURE — 96374 THER/PROPH/DIAG INJ IV PUSH: CPT

## 2023-10-09 PROCEDURE — 72141 MRI NECK SPINE W/O DYE: CPT | Mod: MC

## 2023-10-09 PROCEDURE — 80053 COMPREHEN METABOLIC PANEL: CPT

## 2023-10-09 PROCEDURE — 72146 MRI CHEST SPINE W/O DYE: CPT | Mod: MC

## 2023-10-09 PROCEDURE — 71045 X-RAY EXAM CHEST 1 VIEW: CPT | Mod: 26

## 2023-10-09 PROCEDURE — 85610 PROTHROMBIN TIME: CPT

## 2023-10-09 PROCEDURE — 0042T: CPT | Mod: MA

## 2023-10-09 PROCEDURE — 84295 ASSAY OF SERUM SODIUM: CPT

## 2023-10-09 PROCEDURE — 70498 CT ANGIOGRAPHY NECK: CPT | Mod: MA

## 2023-10-09 PROCEDURE — 87040 BLOOD CULTURE FOR BACTERIA: CPT

## 2023-10-09 PROCEDURE — 81001 URINALYSIS AUTO W/SCOPE: CPT

## 2023-10-09 PROCEDURE — 85025 COMPLETE CBC W/AUTO DIFF WBC: CPT

## 2023-10-09 PROCEDURE — 99285 EMERGENCY DEPT VISIT HI MDM: CPT

## 2023-10-09 PROCEDURE — 99239 HOSP IP/OBS DSCHRG MGMT >30: CPT

## 2023-10-09 PROCEDURE — 70496 CT ANGIOGRAPHY HEAD: CPT | Mod: MA

## 2023-10-09 PROCEDURE — 80307 DRUG TEST PRSMV CHEM ANLYZR: CPT

## 2023-10-09 PROCEDURE — 83036 HEMOGLOBIN GLYCOSYLATED A1C: CPT

## 2023-10-09 PROCEDURE — 82962 GLUCOSE BLOOD TEST: CPT

## 2023-10-09 PROCEDURE — 84443 ASSAY THYROID STIM HORMONE: CPT

## 2023-10-09 PROCEDURE — 0225U NFCT DS DNA&RNA 21 SARSCOV2: CPT

## 2023-10-09 PROCEDURE — 85730 THROMBOPLASTIN TIME PARTIAL: CPT

## 2023-10-09 PROCEDURE — 84132 ASSAY OF SERUM POTASSIUM: CPT

## 2023-10-09 PROCEDURE — 85027 COMPLETE CBC AUTOMATED: CPT

## 2023-10-09 PROCEDURE — 82330 ASSAY OF CALCIUM: CPT

## 2023-10-09 RX ORDER — CEFPODOXIME PROXETIL 100 MG
1 TABLET ORAL
Qty: 6 | Refills: 0
Start: 2023-10-09 | End: 2023-10-11

## 2023-10-09 RX ADMIN — FINASTERIDE 5 MILLIGRAM(S): 5 TABLET, FILM COATED ORAL at 11:23

## 2023-10-09 RX ADMIN — Medication 50 MILLIGRAM(S): at 00:01

## 2023-10-09 RX ADMIN — CEFTRIAXONE 100 MILLIGRAM(S): 500 INJECTION, POWDER, FOR SOLUTION INTRAMUSCULAR; INTRAVENOUS at 13:34

## 2023-10-09 RX ADMIN — Medication 0.25 MILLIGRAM(S): at 00:01

## 2023-10-09 RX ADMIN — Medication 25 MILLIGRAM(S): at 06:25

## 2023-10-09 RX ADMIN — Medication 81 MILLIGRAM(S): at 11:23

## 2023-10-09 RX ADMIN — ENOXAPARIN SODIUM 40 MILLIGRAM(S): 100 INJECTION SUBCUTANEOUS at 15:52

## 2023-10-09 NOTE — DISCHARGE NOTE PROVIDER - NSDCFUSCHEDAPPT_GEN_ALL_CORE_FT
[facility]  NEPHRO [address]  Scheduled Appointment: 10/10/2023

## 2023-10-09 NOTE — DISCHARGE NOTE PROVIDER - NSDCCPCAREPLAN_GEN_ALL_CORE_FT
PRINCIPAL DISCHARGE DIAGNOSIS  Diagnosis: Slurred speech  Assessment and Plan of Treatment: You came in to the hospital due to slurred speech and difficulty getting up out of his chair. Stroke team was called to assess for any signs of stroke as well as imaging of your head and neck done to further evalaute. You had a cat scan and MRI brain done which were both negative for acute stroke as well as no spinal cord compression. Your symptoms of slurred speech improved prior to discharge. Please be sure to follow up with your PCP upon discharge.         SECONDARY DISCHARGE DIAGNOSES  Diagnosis: Pneumonia, aspiration  Assessment and Plan of Treatment: You had an episode of choking on your food during your admission and subsequently developed a fever of 100.8. This was likely due to aspiration of your food. We collected blood cultures and started you on an antibiotic called Ceftriaxone. You will be discharged with oral antiobiotics, please take as directed.     PRINCIPAL DISCHARGE DIAGNOSIS  Diagnosis: Slurred speech  Assessment and Plan of Treatment: You came in to the hospital due to slurred speech and difficulty getting up out of your chair. Stroke team was called to assess for any signs of stroke such as weakness, numbness, facial drooping. You had imaging of your head and neck done to further evalaute the etiology. You had a cat scan and MRI brain/spine done which were both negative for acute stroke as well as no spinal cord compression. Your symptoms of slurred speech improved prior to discharge. Please be sure to follow up with your PCP upon discharge.         SECONDARY DISCHARGE DIAGNOSES  Diagnosis: Pneumonia, aspiration  Assessment and Plan of Treatment: You had an episode of choking on your food during your admission and subsequently developed a fever of 100.8. This was likely due to aspiration of your food. We collected blood cultures and started you on an antibiotic called Ceftriaxone. You will be discharged with oral antiobiotics, please take as directed.

## 2023-10-09 NOTE — PROGRESS NOTE ADULT - PROBLEM SELECTOR PLAN 1
Patient presented with facial droop and slurred speech, now resolved. Patient has recent diagnosis of Lyme disease, follows with Dr. Tommie Kimble. Has been on amoxicillin for 2 weeks and has outpatient f/u appointment tomorrow 10/10. Stroke workup negative. Per neuro, will restart home aspirin and atorvastatin.     Plan:  - CT head and MRI C and T spine negative  - restart aspirin 81mg PO QD  - restart atorvastatin 80mg QD

## 2023-10-09 NOTE — PROGRESS NOTE ADULT - SUBJECTIVE AND OBJECTIVE BOX
OVERNIGHT EVENTS:  stat trazodone 50 mg, alprazolam .25 mg one time    SUBJECTIVE:    Patient seen and examined at bedside. Patient resting comfortably. Patient denies any complaints, feels back to baseline. Patient reports taking off his nasal cannula overnight, no SOB or dyspnea on exertion, ambulated without issue. Patient reports eating and drinking without difficulty. Patient denies chest pain, headache, fever, chills, joint pain.     Vital Signs Last 12 Hrs  T(F): 97.8 (10-09-23 @ 05:51), Max: 97.8 (10-09-23 @ 05:51)  HR: 75 (10-09-23 @ 05:51) (75 - 75)  BP: 112/68 (10-09-23 @ 05:51) (112/68 - 112/68)  BP(mean): --  RR: 18 (10-09-23 @ 05:51) (18 - 18)  SpO2: 95% (10-09-23 @ 05:51) (95% - 95%)  I&O's Summary    08 Oct 2023 07:01  -  09 Oct 2023 07:00  --------------------------------------------------------  IN: 0 mL / OUT: 775 mL / NET: -775 mL        PHYSICAL EXAM:  Constitutional: NAD, comfortable in bed.  HEENT: NC/AT, PERRLA, EOMI, MMM. evidence of facial cosmetic procedures to face and lips  Neck: Supple, no JVD  Respiratory: CTA B/L. No w/r/r.   Cardiovascular: RRR, normal S1 and S2, no m/r/g.   Gastrointestinal: soft NTND, no guarding or rebound tenderness, no palpable masses   Extremities: wwp; no cyanosis, clubbing or edema.   Vascular: Pulses equal and strong throughout.   Neurological: AAOx3, no CN deficits, strength and sensation intact throughout.   Skin: tan skin, No gross skin abnormalities or rashes        LABS:                        12.2   6.11  )-----------( 302      ( 09 Oct 2023 05:30 )             35.3     10-09    143  |  109<H>  |  18  ----------------------------<  114<H>  4.0   |  27  |  0.83    Ca    9.6      09 Oct 2023 05:30  Phos  4.3     10-09  Mg     2.0     10-09    TPro  6.2  /  Alb  3.6  /  TBili  <0.2  /  DBili  x   /  AST  21  /  ALT  24  /  AlkPhos  67  10-09    PT/INR - ( 08 Oct 2023 00:23 )   PT: 11.9 sec;   INR: 1.05          PTT - ( 08 Oct 2023 00:23 )  PTT:27.3 sec            RADIOLOGY & ADDITIONAL TESTS:    MEDICATIONS  (STANDING):  aspirin enteric coated 81 milliGRAM(s) Oral daily  atorvastatin 80 milliGRAM(s) Oral at bedtime  cefTRIAXone   IVPB 1000 milliGRAM(s) IV Intermittent every 24 hours  dextrose 5%. 1000 milliLiter(s) (50 mL/Hr) IV Continuous <Continuous>  dextrose 5%. 1000 milliLiter(s) (100 mL/Hr) IV Continuous <Continuous>  dextrose 50% Injectable 25 Gram(s) IV Push once  dextrose 50% Injectable 12.5 Gram(s) IV Push once  dextrose 50% Injectable 25 Gram(s) IV Push once  enoxaparin Injectable 40 milliGRAM(s) SubCutaneous every 24 hours  finasteride 5 milliGRAM(s) Oral daily  glucagon  Injectable 1 milliGRAM(s) IntraMuscular once  insulin lispro (ADMELOG) corrective regimen sliding scale   SubCutaneous three times a day before meals  metoprolol succinate ER 25 milliGRAM(s) Oral daily    MEDICATIONS  (PRN):  dextrose Oral Gel 15 Gram(s) Oral once PRN Blood Glucose LESS THAN 70 milliGRAM(s)/deciliter

## 2023-10-09 NOTE — DISCHARGE NOTE PROVIDER - ATTENDING DISCHARGE PHYSICAL EXAMINATION:
I have read and agree with the resident Discharge Note above.  Patient seen and discussed with resident team on the day of discharge.     Briefly, 78M with PMHx of HTN, HLD, ED, TAA, urinary incontinence, insomnia, CAD who presented with a transient episode of slurred speech, and confusion.  Diagnosis unclear but ddx includes TIA and HTN urgency given markedly elevated BP on presentation.  Patient has recent dx of Lyme's and is on course of amoxicillin however would not expect    Attending exam on day of discharge:   Gen: NAD, sitting upright in bed   HEENT: NCAT, MMM, clear OP  Neck: supple, trachea at midline  CV: RRR, no m/g/r appreciated  Pulm: CTA B, no w/r/r, normal work of breathing  Abd: +BS, soft, NTND  : deferred  Skin: no rashes, ulcers, jaundice; intact, warm and dry  Ext: WWP, no c/c/e  MSK: 5/5 strength, normal range of motion, no swollen or erythematous joints  Lymph: no postauricular, supraclavicular or submandibular LAD  Neuro: AOx3, no change from baseline  Psych: pleasant, conversant and appropriate    I was physically present for the evaluation and management services provided. I agree with the included history, physical, and plan which I reviewed and edited where appropriate. I spent > 30 minutes with the patient and the patient's family on direct patient care and discharge planning with more than 50% of the visit spent on counseling and/or coordination of care.     Renny Riggs  [phone] I have read and agree with the resident Discharge Note above.  Patient seen and discussed with resident team on the day of discharge.     Briefly, 78M with PMHx of HTN, HLD, ED, TAA, urinary incontinence, insomnia, CAD who presented with a transient episode of slurred speech, and confusion.  Diagnosis unclear but ddx includes TIA and HTN urgency given markedly elevated BP on presentation.  Patient has recent dx of Lyme's and is on course of amoxicillin however would not expect such a transient course of facial nerve palsy.  Patient concerned for ciguatera poisoning although less likely given no GI symptoms, sole fish not typical.  Had aspiration event and fever inpatient so will complete a course of cefpodoxime for gram negative aspiration PNA.  To follow up with Dr. Kimble tomorrow, case discussed with Dr Kimble    Attending exam on day of discharge:   Gen: NAD  HEENT: NCAT, MMM, mild facial asymmetry, evidence of facial cosmetic procedures  Neck: supple  CV: RRR, no m/g/r appreciated  Pulm: CTA B, no w/r/r, normal work of breathing  Abd: Nondistended  : deferred  Skin: no rashes, ulcers, jaundice; intact, warm and dry  Ext: WWP, no c/c/e  Neuro: AOx3, no change from baseline  Psych: pleasant, conversant and appropriate    I was physically present for the evaluation and management services provided. I agree with the included history, physical, and plan which I reviewed and edited where appropriate. I spent > 30 minutes with the patient and the patient's family on direct patient care and discharge planning with more than 50% of the visit spent on counseling and/or coordination of care.     Renny Riggs  [phone]

## 2023-10-09 NOTE — CONSULT NOTE ADULT - ASSESSMENT
Neurology    78 y o  Male with PMHx of HTN (has seen outpatient nephrology for resistant HTN w/u), HLD, erectile dysfunction, thoracic aortic aneurysm (recommended repeat CTA in 05/2023 by cardiologist however don't see results in HIE), urinary incontinence, insomnia, CAD  who presented to the ED for further evaluation of slurred speech and generalized weakness. LKW 10PM 10/07. EMS was called by patient's friend who's a retired physician after he was unable to get out of his chair. Stroke code was called upon arrival to the ED. Upon arrival to the ED, patient with , HR in the low 100s to one teens, and slightly hypoxic sating 92-95% on RA with increased WOB. Initial NIHSS of 6. CTs negative for any acute pathology. Given nonlocalizing symptoms (R NLFF appears to be chronic per friends at bedside) and AMS, initially recommended toxic metabolic w/u. Grossly negative for acute pathology therefore was admitted to stroke for further w/u.     Neuro  #CVA workup  - start aspirin 81mg PO QD  - start atorvastatin 80mg daily  - q4hr stroke neuro checks and vitals  - MRI brain w/o contrast: negative for acute stroke  - f/u official MRI C and T spine results  - f/u UTox  - Stroke Code HCT Results:   - Stroke Code CTA Results:  - Stroke education    Cards  #HTN  - permissive hypertension, Goal -180  - hold home blood pressure medication for now  - Stroke Code EKG Results:  · Rate: 105   · Interpretation: sinus tachycardia, LAD, LBBB, similar to ekg dated 2016     #HLD  - high dose statin as above in CVA  - LDL results: pending    #hx thoracic aortic aneurysm   - last seen by outpatient cards in 05/2023  - recommended rpt CTA chest to assess size, no study seen in HIE   - consider CTS consult    Pulm  - call provider if SPO2 < 94%    #hypoxia  - tachycardic and hypoxic with increased WOB @ time of stroke code  - elevated CO2 on VBG  - BP coming down to the low 100-110s systolic  - can consider CT PE if pressure continues to drop    GI  #Nutrition/Fluids/Electrolytes   - replete K<4 and Mg <2  - Diet: DASH/TLC   - IVF: none    Renal  - daily BMPs    Infectious Disease  - afebrile on admission, no leukocytosis  - urine and CXR negative    Endocrine  #DM  - A1C results: pending    - TSH results: pending    DVT Prophylaxis  - SCDs for DVT prophylaxis   - SQL pending weight    Dispo: pending PM&R ,    PT/OT     Discussed daily hospital plans and goals with patient and friends at bedside    Discussed with Stroke Fellow, Dr. Slade and Neurology Attending. Dr. Shahid

## 2023-10-09 NOTE — DISCHARGE NOTE PROVIDER - NSDCMRMEDTOKEN_GEN_ALL_CORE_FT
ALPRAZolam 0.25 mg oral tablet: 1 tab(s) orally every 12 hours  amLODIPine 5 mg oral tablet: 1 tab(s) orally every 12 hours  cefpodoxime 200 mg oral tablet: 1 tab(s) orally 2 times a day  ezetimibe 10 mg oral tablet: 1 tab(s) orally once a day  finasteride 5 mg oral tablet: 1 tab(s) orally once a day  hydroCHLOROthiazide 12.5 mg oral tablet: 1 tab(s) orally once a day  losartan 50 mg oral tablet: 1 tab(s) orally every 12 hours  Metoprolol Succinate ER 25 mg oral tablet, extended release: 1 tab(s) orally once a day  rosuvastatin 5 mg oral tablet: 1 tab(s) orally once a day (at bedtime)  traZODone 50 mg oral tablet: 50 milligram(s) orally once a day (at bedtime) as needed for  insomnia

## 2023-10-09 NOTE — DISCHARGE NOTE PROVIDER - CARE PROVIDER_API CALL
Tommie Kimble   Internal Medicine  [address] [address]  Phone: [phone]  Fax: [phone]  Scheduled Appointment: 10/10/2023

## 2023-10-09 NOTE — PROGRESS NOTE ADULT - ASSESSMENT
78M with PMHx of HTN, HLD, ED, TAA, urinary incontinence, insomnia, CAD who presents with generalized weakness, slurred speech, and confusion 2/2 recent diagnosis of Lyme disease on amoxicillin for past 2 weeks. Patient's stroke and toxic metabolic workups unremarkable, patient transferred to medicine for possible aspiration event.

## 2023-10-09 NOTE — PROGRESS NOTE ADULT - PROBLEM SELECTOR PLAN 3
EKG showing LAD, LBBB similar to EKG from 2016.     Plan:  - per neuro, permissive hypertension with goal SBPs 120-180  - hold home Toprol

## 2023-10-09 NOTE — CONSULT NOTE ADULT - SUBJECTIVE AND OBJECTIVE BOX
Patient is a 78y old  Male who presents with a chief complaint of stroke w/u (08 Oct 2023 09:53)      HPI:  78y Male with PMHx of HTN (has seen outpatient nephrology for resistant HTN w/u), HLD, erectile dysfunction, thoracic aortic aneurysm (recommended repeat CTA in 05/2023 by cardiologist however don't see results in HIE), urinary incontinence, insomnia, CAD  who presented to the ED for further evaluation of slurred speech and generalized weakness. Patient woke up in his USOH at 1 PM on 10/7 and went to dinner with his friends earlier in the evening. Friend at bedside, who's a retired physician, states that once they returned home from dinner at approx 10 PM patient was noted to have slurred speech and difficulty getting up out of his chair. Notes that patient did not ingest any alcohol or take any of his medications for sleep prior to the onset of his symptoms. Patient is independent at baseline, lives in a 2 story home, does not walk with any assistance. Friend noted that this was abnormal for the patient, prompting EMS to be called. Patient endorses feeling generally fatigued, didn't endorsed any weakness to one particular side of the body. Patient denies any focal numbness, tingling, dizziness, lightheadedness, headache, N/V, vision changes, neck pain. Upon arrival to the ED, patient with , HR in the low 100s to one teens, and slightly hypoxic sating 92-95% on RA with increased WOB. Stroke code was called. On initial examination, patient appeared to be somewhat altered with difficulty maintaining eye opening for extended periods however responsive to voice. Speech did not appear to be overtly slurred however friend at bedside said that he did not sound like himself. No expressive or receptive aphasia. B/l UEs 4-/5 without drift, able to maintain AG > 10 seconds. Sensation intact. Initial NIHSS of 6 (? dysarthria, R NLFF and b/l LEs with a drift, hit the bed < 5 seconds). Gave patient a camera to look at his face to see if flattening was his baseline, patient stated that he wasn't sure however patient's friend felt that it was. Notes that he does have a history of facioplasty and other surgeries on his face in the past. CTH, CTA H/N and CTP were unremarkable. Taken back to the ED and was noted to have an improvement in his BP to the 160s systolic. Given patient's encephalopathic appearance, recommended a toxic metabolic work up which was grossly unrevealing. Reevaluated patient again in the ED, BP improved to the 140s systolic. Noted to have an improvement in his exam, now able to hold both of his legs up antigravity > 5 seconds without a drift. Another one of patient's friends appeared at bedside. Agrees that patient's face appears to be at his baseline. Feels speech may be ever so slightly slurred however also notes that patient has been getting less sleep then normal as he was preparing for his friends arrival. Repeat NIHSS of 2. Admitted to stroke for further work up. (08 Oct 2023 05:46)    PAST MEDICAL & SURGICAL HISTORY:  Aneurysm      HTN (hypertension)      HLD (hyperlipidemia)      BPH (benign prostatic hyperplasia)      Insomnia      H/O shoulder surgery      H/O neck surgery        MEDICATIONS  (STANDING):  aspirin enteric coated 81 milliGRAM(s) Oral daily  atorvastatin 80 milliGRAM(s) Oral at bedtime  cefTRIAXone   IVPB 1000 milliGRAM(s) IV Intermittent every 24 hours  dextrose 5%. 1000 milliLiter(s) (100 mL/Hr) IV Continuous <Continuous>  dextrose 5%. 1000 milliLiter(s) (50 mL/Hr) IV Continuous <Continuous>  dextrose 50% Injectable 25 Gram(s) IV Push once  dextrose 50% Injectable 12.5 Gram(s) IV Push once  dextrose 50% Injectable 25 Gram(s) IV Push once  enoxaparin Injectable 40 milliGRAM(s) SubCutaneous every 24 hours  finasteride 5 milliGRAM(s) Oral daily  glucagon  Injectable 1 milliGRAM(s) IntraMuscular once  insulin lispro (ADMELOG) corrective regimen sliding scale   SubCutaneous three times a day before meals  metoprolol succinate ER 25 milliGRAM(s) Oral daily    MEDICATIONS  (PRN):  dextrose Oral Gel 15 Gram(s) Oral once PRN Blood Glucose LESS THAN 70 milliGRAM(s)/deciliter          FAMILY HISTORY:      CBC Full  -  ( 09 Oct 2023 05:30 )  WBC Count : 6.11 K/uL  RBC Count : 3.87 M/uL  Hemoglobin : 12.2 g/dL  Hematocrit : 35.3 %  Platelet Count - Automated : 302 K/uL  Mean Cell Volume : 91.2 fl  Mean Cell Hemoglobin : 31.5 pg  Mean Cell Hemoglobin Concentration : 34.6 gm/dL  Auto Neutrophil # : x  Auto Lymphocyte # : x  Auto Monocyte # : x  Auto Eosinophil # : x  Auto Basophil # : x  Auto Neutrophil % : x  Auto Lymphocyte % : x  Auto Monocyte % : x  Auto Eosinophil % : x  Auto Basophil % : x      10-09    143  |  109<H>  |  18  ----------------------------<  114<H>  4.0   |  27  |  0.83    Ca    9.6      09 Oct 2023 05:30  Phos  4.3     10-09  Mg     2.0     10-09    TPro  6.2  /  Alb  3.6  /  TBili  <0.2  /  DBili  x   /  AST  21  /  ALT  24  /  AlkPhos  67  10-09      Urinalysis Basic - ( 09 Oct 2023 05:30 )    Color: x / Appearance: x / SG: x / pH: x  Gluc: 114 mg/dL / Ketone: x  / Bili: x / Urobili: x   Blood: x / Protein: x / Nitrite: x   Leuk Esterase: x / RBC: x / WBC x   Sq Epi: x / Non Sq Epi: x / Bacteria: x        Radiology :     < from: CT Brain Stroke Protocol (10.08.23 @ 00:20) >  ACC: [id number] EXAM:  CT BRAIN STROKE PROTOCOL   ORDERED BY: EMERSON KEENE     PROCEDURE DATE:  10/08/2023          INTERPRETATION:  PROCEDURE: CT head without intravenous contrast    INDICATIONS: Stroke code. Slurred speech generalized weakness. Last known   well 10:00 PM today.    TECHNIQUE:  Serial axial images were obtained from the skull base to the   vertex without the use of intravenous contrast. Coronal and sagittal   reformatted images were obtained. RAPID AI was used for preliminary   interpretation.    COMPARISON EXAMINATION: None.    FINDINGS:  VENTRICLES AND SULCI: Prominent sulci and cisternal spaces secondary to   generalized age-related parenchymal volume loss. Ventricles are   commensurate in size.  INTRA-AXIAL: No acuteparenchymal hemorrhage, mass effect, or midline   shift is present. The gray-white matter differentiation is preserved   without evidence of recent transcortical infarct. Normal density of   insular cortex is preserved. There is mild patchy hypodensity within the   periventricular white matter, consistent with sequelae of chronic small   vessel ischemic disease.  EXTRA-AXIAL: No blood products present. A 4.0 x 2.0 benign-appearing   cystic lesion is noted above the cerebellar vermis.  VISUALIZED SINUSES: Scattered mucosal thickening of the bilateral ethmoid   air cells.  VISUALIZED MASTOIDS: Well-aerated bilaterally.  CALVARIUM: Unremarkable.  MISCELLANEOUS: Noted right shoulder arthroplasty on  film.    IMPRESSION: No acute intracranialhemorrhage, mass effect, or demarcated   territorial infarct.    < from: CT Brain Perfusion Maps Stroke (10.08.23 @ 00:20) >  ACC: [id number] EXAM:  CT BRAIN PERFUSION MAPS STROKE   [facility]     PROCEDURE DATE:  10/08/2023          INTERPRETATION:  INDICATION: Stroke code    TECHNIQUE: Following the intravenous administration of 40 ml of Isovue   370, serial axial images were obtained through the brain. The CT   perfusion data set was post processed per [facility] protocol   generating color maps of CBF (cerebral blood flow), CBV (cerebral blood   volume), MTT (mean transit time), and Tmax.    COMPARISON: None    FINDINGS: The CT perfusion study demonstrates no perfusion abnormality.   There is no mismatch volume.    CBF less than 30% volume: 0 mL.  Tmax greater than 6 seconds: 0 mL.  Mismatch volume: 0 mL.  Mismatch ratio: None    IMPRESSION: No evidence of CBF less than 30% or Tmax greater than 6   seconds.      < from: CT Angio Neck Stroke Protocol w/ IV Cont (10.08.23 @ 00:22) >    ACC: [id number] EXAM:  CT ANGIO NECK STROKE PROTCL IC   ORDERED BY: EMERSON   ALBERT     ACC: [id number] EXAM:  CT ANGIO BRAIN STROKE PROTC IC   [facility]     PROCEDURE DATE:  10/08/2023          INTERPRETATION:  CT ANGIOGRAM OF THE HEAD AND NECK WITH CONTRAST    INDICATION:  Stroke Code    TECHNIQUE: Axial contrast enhanced CT angiogram of the head and neck was   performed during timed bolus infusion of intravenous contrast. MIP   reformats were generated in the sagittal and coronal plane. 3-D surface   renderings were also generated.    COMPARISON:  None    FINDINGS:    CTA NECK:    The right common carotid artery is normal in course and caliber. There is   no hemodynamically significant stenosis of the right internal carotid   artery. The right external carotid artery is also patent.    The left common carotid artery is normal in course and caliber. There is   no hemodynamically significant stenosis of the left internal carotid   artery. The left external carotid artery is also patent.    The extracranial segments of the  vertebral arteries are patent.    The included soft tissues of the neck are grossly unremarkable.      CTA HEAD:  The bilateral internal carotid arteries are patent. The middle cerebral   and anterior cerebral arteries are patent. Hypoplastic right A1 segment.    The anterior communicating artery is normal in caliber.    There is normal opacification of the distal vertebral arteries and   basilar artery. The bilateral SCA and PCA arteries are patent.    IMPRESSION:  No high-grade arterial stenosis or occlusion.      < from: MR Head No Cont (10.08.23 @ 05:04) >  ACC: [id number] EXAM:  MR BRAIN   ORDERED BY: NYDIA LOPEZ     PROCEDURE DATE:  10/08/2023          INTERPRETATION:  MRI OF THE BRAIN WITHOUT CONTRAST    INDICATION:  Evaluate for CVA    TECHNIQUE: Multiplanar, multisequence MR images from the skull base to   the vertex were obtained without the administration of intravenous   contrast.    CONTRAST: None    COMPARISON:  CT head 10/8/2023    FINDINGS:  There is no restricted diffusion. Minimal periventricular white matter   microangiopathic ischemic changes    No midline shift, hydrocephalus or focal mass effect. The basal cisterns   are patent.    Mucosal thickening involving the bilateral ethmoid sinus.    IMPRESSION:  No acute infarct.    < from: MR Cervical Spine No Cont (10.08.23 @ 05:04) >  ACC: [id number] EXAM:  MR SPINE CERVICAL   ORDERED BY: NYDIA LOPEZ     PROCEDURE DATE:  10/08/2023          INTERPRETATION:  CLINICAL HISTORY: generalized weakness, slurred speech    COMPARISON: MR cervical spine dated 12/14/2022    TECHNIQUE: Cervical spine MRI: Multiplanar, multisequence MR images of   the cervical spine are obtained without administration of intravenous   gadolinium.    FINDINGS:  Motion artifact limits evaluation of the axial series.  Moderate to severe disc desiccation at C3/C4 and C6/C7 levels, unchanged.   Minimal retrolisthesis of C7 over T1, unchanged.  Heterogeneous T1 marrow signal. No cord signal abnormality. No evidence   of cord compression. No bone marrow edema. There is no evidence for acute   fracture. A normal lordosis is noted. Craniocervical junction is normal.   The cervicovertebral body heights and remaining intervertebral disc   spaces are preserved. There is no prevertebral soft tissue abnormality.   Moderate multilevel facet hypertrophy.      Evaluation of the individual levels:  C2/C3 level: No spinal canal stenosis or foraminal narrowing.  C3/C4 level: Broad-based disc ridge complex causing severe bilateral   foraminal narrowing. Mild spinal canal stenosis  C4/C5 level: Broad-based ridging causing severe bilateral foraminal   narrowing. Mild spinal canal stenosis.  C5/C6 level: Broad-based ridging causing severe bilateral foraminal   narrowing and mild spinal canal stenosis  C6/C7 level: Broad-based ridging causing moderate bilateralforaminal   narrowing. Mild spinal canal stenosis.  C7/T1 level:  No spinal canal stenosis or foraminal narrowing.    Stable absent/atrophy of the left sternocleidomastoid muscle. Left   submandibular gland is not visualized.    IMPRESSION:    Moderate multilevel discogenic degenerative changes. No evidence of cord   signal abnormality.    < from: MR Thoracic Spine No Cont (10.08.23 @ 05:11) >    < from: MR Thoracic Spine No Cont (10.08.23 @ 05:11) >  IMPRESSION: No acute osseous abnormality. No evidence of cord compression   or cord signal abnormality. Discogenic degenerative changes, as above.   Consider postcontrast images as clinically warranted.       Review of Systems : per HPI         Vital Signs Last 24 Hrs  T(C): 36.6 (09 Oct 2023 05:51), Max: 38.2 (08 Oct 2023 09:29)  T(F): 97.8 (09 Oct 2023 05:51), Max: 100.8 (08 Oct 2023 09:29)  HR: 75 (09 Oct 2023 05:51) (66 - 80)  BP: 112/68 (09 Oct 2023 05:51) (107/60 - 143/77)  BP(mean): 99 (08 Oct 2023 21:54) (78 - 99)  RR: 18 (09 Oct 2023 05:51) (16 - 18)  SpO2: 95% (09 Oct 2023 05:51) (92% - 97%)    Parameters below as of 09 Oct 2023 05:51  Patient On (Oxygen Delivery Method): nasal cannula            Physical Exam :   78 y o man lying comfortably in semi Zimmerman's position , awake , alert , no acute complaints     Head : normocephalic , atraumatic    Eyes : PERRLA , EOMI , no nystagmus , sclera anicteric    ENT / FACE : neg nasal discharge , uvula midline , no oropharyngeal erythema / exudate    Neck : supple , negative JVD , negative carotid bruits , no thyromegaly    Chest : CTA bilaterally , neg wheeze / rhonchi / rales / crackles / egophany    Cardiovascular : regular rate and rhythm , neg murmurs / rubs / gallops    Abdomen : soft , non distended , non tender to palpation in all 4 quadrants ,  normal bowel sounds     Extremities : WWP , neg cyanosis /clubbing / edema     Neurologic Exam :     Alert and oriented to person , place , date/year , speech fluent w/o dysarthria , follows commands     Cranial Nerves:           II :                         pupils equal , round and reactive to light , visual fields intact         III/ IV/VI :              extraocular movements intact , neg nystagmus , neg ptosis        V :                        facial sensation intact , V1-3 normal        VII :                      chronic R NLFF  , normal eye closure and smile        VIII :                     hearing intact to finger rub bilaterally        IX and X :             no hoarseness , gag intact , palate/ uvula rise symmetrically        XI :                       SCM / trapezius strength intact bilateral        XII :                      no tongue deviation       Motor Exam:                Right UE:                     no focal weakness ,  > 3+/5 throughout  , no pronator drift       Left UE:                       no focal weakness ,  > 3+/5 throughout  , no pronator drift         Right LE:          no focal weakness ,  > 3+/5 throughout          Left LE:          no focal weakness ,  > 3+/5 throughout           Sensation :         intact to light touch x 4 extremities                                 DTR :           biceps/brachioradialis : equal                            patella/ankle : equal           neg Babinski         Coordination :            Finger to Nose :  neg dysmetria bilaterally        Gait :  not tested          PM&R Impression : admitted for c/o slurred speech and generalized weakness    - no acute pathology on CT and MRI imaging         Recommendations / Plan :       1) Physical / Occupational therapy focusing on therapeutic exercises , equipment evaluation , bed mobility/transfer out of bed evaluation , progressive ambulation with assistive devices prn .    2) Current disposition plan recommendation  :    pending functional progress

## 2023-10-09 NOTE — PROGRESS NOTE ADULT - PROBLEM/PLAN-2
Patient returned call. Notified him of results below. He verbalized understanding. He notified writer of new phone number: [phone]; chart updated.   DISPLAY PLAN FREE TEXT

## 2023-10-09 NOTE — DISCHARGE NOTE PROVIDER - NSDCCPTREATMENT_GEN_ALL_CORE_FT
PRINCIPAL PROCEDURE  Procedure: Brain CT  Findings and Treatment: No acute intracranial hemorrhage, mass effect, or demarcated   territorial infarct.     PRINCIPAL PROCEDURE  Procedure: Brain CT  Findings and Treatment: No acute intracranial hemorrhage, mass effect, or demarcated   territorial infarct.      SECONDARY PROCEDURE  Procedure: MRI thoracic spine  Findings and Treatment: No acute osseous abnormality. No evidence of cord compression   or cord signal abnormality. Discogenic degenerative changes, as above.   Consider postcontrast images as clinically warranted.

## 2023-10-09 NOTE — PROGRESS NOTE ADULT - PROBLEM SELECTOR PLAN 2
Patient reportedly choked on piece of chicken on 10/8, became hypoxic requiring 2LNC and febrile to 100.8. Blood cultures and RVP sent. Ceftriaxone started empirically. Today pt self-weaned NC and asymptomatic. CXR showing pulmonary vascular congestion.    Plan:  - continue ceftriaxone 1g q24h  - obtain ambulatory sats on room air  - BCx no growth at 12 hrs  - RVP and COVID negative

## 2023-10-09 NOTE — DISCHARGE NOTE PROVIDER - HOSPITAL COURSE
78M with PMHx of HTN, HLD, ED, TAA, urinary incontinence, insomnia, CAD who presents with generalized weakness, slurred speech, and confusion 2/2 recent diagnosis of Lyme disease on amoxicillin for past 2 weeks. Patient's stroke and toxic metabolic workups unremarkable, patient transferred to medicine for possible aspiration event.     Hospital course (by problem list):     #Facial paralysis.   Patient presented with facial droop and slurred speech, now resolved. Patient has recent diagnosis of Lyme disease, follows with Dr. Tommie Kimble. Has been on amoxicillin for 2 weeks and has outpatient f/u appointment tomorrow 10/10. Stroke workup negative. Per neuro, restart home aspirin and atorvastatin.     Plan:  - CT head and MRI C and T spine negative  - restart aspirin 81mg PO QD  - restart atorvastatin 80mg QD.    #Pneumonia, aspiration.   ·  Plan: Patient reportedly choked on piece of chicken on 10/8, became hypoxic requiring 2LNC and febrile to 100.8. Blood cultures and RVP sent. Ceftriaxone started empirically. Today pt self-weaned NC and asymptomatic. CXR showing pulmonary vascular congestion.    Plan:  - continue ceftriaxone 1g q24h  - obtain ambulatory sats on room air  - BCx no growth at 12 hrs  - RVP and COVID negative.    #Chronic hypertension.   ·  Plan: EKG showing LAD, LBBB similar to EKG from 2016.     Plan:  - per neuro, permissive hypertension with goal SBPs 120-180  - hold home Toprol.    #Hyperlipidemia.   ·  Plan: - c/w home atorvastatin 80 mg QHS.    #Thoracic aortic aneurysm.   ·  Plan: Last seen by outpatient cardiology in May 2023 with Dr. Watson.     - outpatient f/u.    #Urinary incontinence.   ·  Plan: - c/w home finasteride 5 mg QD.    #Coronary artery disease.   ·  Plan: - c/w aspirin 81 mg QD. 78M with PMHx of HTN, HLD, ED, TAA, urinary incontinence, insomnia, CAD who presented with generalized weakness, slurred speech, and confusion. Pt had recent diagnosis of Lyme disease on amoxicillin for past 2 weeks. Patient's stroke and toxic metabolic workups unremarkable, patient transferred to medicine for possible aspiration event.     Hospital course (by problem list):     #Facial paralysis.   Patient presented with facial droop and slurred speech, now resolved. Patient has recent diagnosis of Lyme disease, follows with Dr. Tommie Kimble. Has been on amoxicillin for 2 weeks and has outpatient f/u appointment tomorrow 10/10. Stroke workup negative.   - CT head and MRI C and T spine negative  - restarted aspirin 81mg PO QD and atorvastatin 80mg QD.    #Pneumonia, aspiration.   Patient reportedly choked on piece of chicken on 10/8, became hypoxic requiring 2LNC and febrile to 100.8. Blood cultures and RVP sent. Ceftriaxone started empirically. Today pt self-weaned NC and asymptomatic. CXR showing pulmonary vascular congestion.  - Started on ceftriaxone 1g q24h  - BCx no growth to date  - RVP and COVID negative.  -Discharge with Cefpodoxime 200 BID x3days     #Chronic hypertension. EKG showing LAD, LBBB similar to EKG from 2016.   - per neuro, permissive hypertension with goal SBPs 120-180  -c/w home meds    #Hyperlipidemia.   -c/w home atorvastatin 80 mg QHS.    #Thoracic aortic aneurysm.   Last seen by outpatient cardiology in May 2023 with Dr. Watson.   - outpatient f/u.    #Urinary incontinence.   -c/w home finasteride 5 mg QD.    #Coronary artery disease.   -c/w aspirin 81 mg QD.    Patient was discharged to: Home     New medications: Cefpodoxime 200 BID x3days     Physical exam at the time of discharge:   Constitutional: NAD, comfortable in bed.  HEENT: NC/AT, PERRLA, EOMI, MMM. evidence of facial cosmetic procedures to face and lips  Neck: Supple, no JVD  Respiratory: CTA B/L. No w/r/r.   Cardiovascular: RRR, normal S1 and S2, no m/r/g.   Gastrointestinal: soft NTND, no guarding or rebound tenderness, no palpable masses   Extremities: wwp; no cyanosis, clubbing or edema.   Vascular: Pulses equal and strong throughout.   Neurological: AAOx3, no CN deficits, strength and sensation intact throughout.   Skin: tan skin, No gross skin abnormalities or rashes   78M with PMHx of HTN, HLD, ED, TAA, urinary incontinence, insomnia, CAD who presented with generalized weakness, slurred speech, and confusion. Pt had recent diagnosis of Lyme disease on amoxicillin for past 2 weeks. Patient's stroke and toxic metabolic workups unremarkable, patient transferred to medicine for possible aspiration event.     Hospital course (by problem list):     #Facial paralysis.   Patient presented with facial droop and slurred speech, now resolved. Patient has recent diagnosis of Lyme disease, follows with Dr. Tommie Kimble. Has been on amoxicillin for 2 weeks and has outpatient f/u appointment tomorrow 10/10. Stroke workup negative.   - CT head and MRI C and T spine negative  - C/w home meds     #Pneumonia, aspiration.   Patient reportedly choked on piece of chicken on 10/8, became hypoxic requiring 2LNC and febrile to 100.8. Blood cultures and RVP sent. Ceftriaxone started empirically. Today pt self-weaned NC and asymptomatic. CXR showing pulmonary vascular congestion.  - Started on ceftriaxone 1g q24h  - BCx no growth to date  - RVP and COVID negative.  -Discharge with Cefpodoxime 200 BID x3days     #Chronic hypertension. EKG showing LAD, LBBB similar to EKG from 2016.   - per neuro, permissive hypertension with goal SBPs 120-180  -c/w home meds    #Hyperlipidemia.   -c/w home atorvastatin 80 mg QHS.    #Thoracic aortic aneurysm.   Last seen by outpatient cardiology in May 2023 with Dr. Watson.   - outpatient f/u.    #Urinary incontinence.   -c/w home finasteride 5 mg QD.    #Coronary artery disease.   -c/w aspirin 81 mg QD.    Patient was discharged to: Home     New medications: Cefpodoxime 200 BID x3days     Physical exam at the time of discharge:   Constitutional: NAD, comfortable in bed.  HEENT: NC/AT, PERRLA, EOMI, MMM. evidence of facial cosmetic procedures to face and lips  Neck: Supple, no JVD  Respiratory: CTA B/L. No w/r/r.   Cardiovascular: RRR, normal S1 and S2, no m/r/g.   Gastrointestinal: soft NTND, no guarding or rebound tenderness, no palpable masses   Extremities: wwp; no cyanosis, clubbing or edema.   Vascular: Pulses equal and strong throughout.   Neurological: AAOx3, no CN deficits, strength and sensation intact throughout.   Skin: tan skin, No gross skin abnormalities or rashes

## 2023-10-09 NOTE — DISCHARGE NOTE NURSING/CASE MANAGEMENT/SOCIAL WORK - PATIENT PORTAL LINK FT
You can access the FollowMyHealth Patient Portal offered by [facility] by registering at the following website: [email or web address]. By joining [facility]’s FollowMyHealth portal, you will also be able to view your health information using other applications (apps) compatible with our system.

## 2023-10-09 NOTE — DISCHARGE NOTE NURSING/CASE MANAGEMENT/SOCIAL WORK - NSDCPEFALRISK_GEN_ALL_CORE
For information on Fall & Injury Prevention, visit: [email or web address] OR  [email or web address] OR  https://www.cdc.gov/steadi/patient.html

## 2023-10-10 ENCOUNTER — APPOINTMENT (OUTPATIENT)
Dept: NEPHROLOGY | Facility: CLINIC | Age: 78
End: 2023-10-10
Payer: MEDICARE

## 2023-10-10 VITALS — SYSTOLIC BLOOD PRESSURE: 108 MMHG | HEART RATE: 80 BPM | DIASTOLIC BLOOD PRESSURE: 65 MMHG

## 2023-10-10 VITALS — SYSTOLIC BLOOD PRESSURE: 106 MMHG | DIASTOLIC BLOOD PRESSURE: 65 MMHG

## 2023-10-10 DIAGNOSIS — R53.83 OTHER FATIGUE: ICD-10-CM

## 2023-10-10 PROBLEM — G47.00 INSOMNIA, UNSPECIFIED: Chronic | Status: ACTIVE | Noted: 2023-10-08

## 2023-10-10 PROCEDURE — 99214 OFFICE O/P EST MOD 30 MIN: CPT

## 2023-10-12 PROBLEM — R53.83 FATIGUE, UNSPECIFIED TYPE: Status: ACTIVE | Noted: 2023-10-12

## 2023-10-13 LAB
CULTURE RESULTS: SIGNIFICANT CHANGE UP
SPECIMEN SOURCE: SIGNIFICANT CHANGE UP

## 2023-10-20 DIAGNOSIS — I25.10 ATHEROSCLEROTIC HEART DISEASE OF NATIVE CORONARY ARTERY WITHOUT ANGINA PECTORIS: ICD-10-CM

## 2023-10-20 DIAGNOSIS — G51.0 BELL'S PALSY: ICD-10-CM

## 2023-10-20 DIAGNOSIS — J15.69 PNEUMONIA DUE TO OTHER GRAM-NEGATIVE BACTERIA: ICD-10-CM

## 2023-10-20 DIAGNOSIS — I10 ESSENTIAL (PRIMARY) HYPERTENSION: ICD-10-CM

## 2023-10-20 DIAGNOSIS — I71.20 THORACIC AORTIC ANEURYSM, WITHOUT RUPTURE, UNSPECIFIED: ICD-10-CM
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
## 2023-10-20 DIAGNOSIS — E78.5 HYPERLIPIDEMIA, UNSPECIFIED: ICD-10-CM

## 2023-10-20 DIAGNOSIS — I44.7 LEFT BUNDLE-BRANCH BLOCK, UNSPECIFIED: ICD-10-CM

## 2023-10-20 DIAGNOSIS — T17.920A FOOD IN RESPIRATORY TRACT, PART UNSPECIFIED CAUSING ASPHYXIATION, INITIAL ENCOUNTER: ICD-10-CM

## 2023-10-20 DIAGNOSIS — J69.0 PNEUMONITIS DUE TO INHALATION OF FOOD AND VOMIT: ICD-10-CM

## 2023-10-20 DIAGNOSIS — N40.0 BENIGN PROSTATIC HYPERPLASIA WITHOUT LOWER URINARY TRACT SYMPTOMS: ICD-10-CM

## 2023-10-20 DIAGNOSIS — G47.00 INSOMNIA, UNSPECIFIED: ICD-10-CM

## 2023-10-20 DIAGNOSIS — R32 UNSPECIFIED URINARY INCONTINENCE: ICD-10-CM

## 2023-10-20 DIAGNOSIS — R47.81 SLURRED SPEECH: ICD-10-CM

## 2023-10-31 ENCOUNTER — APPOINTMENT (OUTPATIENT)
Dept: NEPHROLOGY | Facility: CLINIC | Age: 78
End: 2023-10-31
Payer: MEDICARE

## 2023-10-31 PROCEDURE — 93784 AMBL BP MNTR W/SOFTWARE: CPT

## 2023-11-13 ENCOUNTER — APPOINTMENT (OUTPATIENT)
Dept: NEPHROLOGY | Facility: CLINIC | Age: 78
End: 2023-11-13

## 2023-11-15 ENCOUNTER — APPOINTMENT (OUTPATIENT)
Dept: HEART AND VASCULAR | Facility: CLINIC | Age: 78
End: 2023-11-15
Payer: MEDICARE

## 2023-11-15 VITALS
TEMPERATURE: 97.3 F | WEIGHT: 186 LBS | HEIGHT: 70 IN | SYSTOLIC BLOOD PRESSURE: 131 MMHG | BODY MASS INDEX: 26.63 KG/M2 | HEART RATE: 77 BPM | OXYGEN SATURATION: 95 % | DIASTOLIC BLOOD PRESSURE: 78 MMHG

## 2023-11-15 PROCEDURE — 99213 OFFICE O/P EST LOW 20 MIN: CPT

## 2023-11-15 PROCEDURE — 93000 ELECTROCARDIOGRAM COMPLETE: CPT

## 2023-11-20 ENCOUNTER — RESULT CHARGE (OUTPATIENT)
Age: 78
End: 2023-11-20

## 2023-11-21 ENCOUNTER — NON-APPOINTMENT (OUTPATIENT)
Age: 78
End: 2023-11-21

## 2023-12-18 ENCOUNTER — RESULT REVIEW (OUTPATIENT)
Age: 78
End: 2023-12-18

## 2023-12-19 ENCOUNTER — RESULT REVIEW (OUTPATIENT)
Age: 78
End: 2023-12-19

## 2024-01-10 ENCOUNTER — NON-APPOINTMENT (OUTPATIENT)
Age: 79
End: 2024-01-10

## 2024-01-29 ENCOUNTER — APPOINTMENT (OUTPATIENT)
Dept: HEART AND VASCULAR | Facility: CLINIC | Age: 79
End: 2024-01-29
Payer: MEDICARE

## 2024-01-29 VITALS
TEMPERATURE: 97.5 F | DIASTOLIC BLOOD PRESSURE: 87 MMHG | HEIGHT: 70 IN | BODY MASS INDEX: 26.34 KG/M2 | HEART RATE: 72 BPM | SYSTOLIC BLOOD PRESSURE: 158 MMHG | OXYGEN SATURATION: 96 % | WEIGHT: 184 LBS

## 2024-01-29 PROCEDURE — 99214 OFFICE O/P EST MOD 30 MIN: CPT

## 2024-01-29 RX ORDER — LOSARTAN POTASSIUM 50 MG/1
50 TABLET, FILM COATED ORAL
Qty: 90 | Refills: 3 | Status: DISCONTINUED | COMMUNITY
End: 2024-01-29

## 2024-01-29 NOTE — DISCUSSION/SUMMARY
[FreeTextEntry1] : 1.Cad- mild cad on ccta , asympt, borderline lcx ffr but asympt contineu asa  2.Aortic aneurysm- expanding but pt reluctant to seek surgical consult at this time, we agreed to have a cmr in 6 months  - increase toprol to 50 bid -continue losartan 50  3.chol- continue crestor 10 q d  4.htn- 135/80 on manual retake contineu mneds but increased toprol as above

## 2024-01-29 NOTE — HISTORY OF PRESENT ILLNESS
[FreeTextEntry1] : 77 yo male with pmh sig for thn chol aortic aneurysm ccta with expansion  patient reluctant to get cts consult   overall feeling well, one  episode of elevated BP

## 2024-01-29 NOTE — PHYSICAL EXAM
Procedure:  COLONOSCOPY    Relevant Problems   No relevant active problems   The patient was born with imperforate anus  She underwent surgery and subsequent revision of colostomy  Physical Exam    Airway    Mallampati score: I  TM Distance: >3 FB  Neck ROM: full     Dental   Comment: Denies loose teeth,     Cardiovascular  Cardiovascular exam normal    Pulmonary  Pulmonary exam normal     Other Findings  Portions of exam deferred due to low yield and/or unknown COVID status      Anesthesia Plan  ASA Score- 2     Anesthesia Type- IV sedation with anesthesia with ASA Monitors  Additional Monitors:   Airway Plan:           Plan Factors-Exercise tolerance (METS): >4 METS  Chart reviewed  Existing labs reviewed  Patient summary reviewed  Patient is not a current smoker  Induction- intravenous  Postoperative Plan-     Informed Consent- Anesthetic plan and risks discussed with patient  I personally reviewed this patient with the CRNA  Discussed and agreed on the Anesthesia Plan with the CRNA  Estelita Swift [Well Developed] : well developed [Well Nourished] : well nourished [No Acute Distress] : no acute distress [Normal Conjunctiva] : normal conjunctiva [Normal Venous Pressure] : normal venous pressure [No Carotid Bruit] : no carotid bruit [Normal S1, S2] : normal S1, S2 [No Murmur] : no murmur [No Rub] : no rub [No Gallop] : no gallop [Clear Lung Fields] : clear lung fields [Good Air Entry] : good air entry [No Respiratory Distress] : no respiratory distress  [Soft] : abdomen soft [Non Tender] : non-tender [No Masses/organomegaly] : no masses/organomegaly [Normal Bowel Sounds] : normal bowel sounds [Normal Gait] : normal gait [No Edema] : no edema [No Cyanosis] : no cyanosis [No Clubbing] : no clubbing [No Varicosities] : no varicosities [No Rash] : no rash [No Skin Lesions] : no skin lesions [Moves all extremities] : moves all extremities [No Focal Deficits] : no focal deficits [Normal Speech] : normal speech [Alert and Oriented] : alert and oriented [Normal memory] : normal memory

## 2024-02-01 ENCOUNTER — APPOINTMENT (OUTPATIENT)
Dept: NEPHROLOGY | Facility: CLINIC | Age: 79
End: 2024-02-01

## 2024-02-16 RX ORDER — LOSARTAN POTASSIUM 50 MG/1
50 TABLET, FILM COATED ORAL DAILY
Qty: 90 | Refills: 3 | Status: ACTIVE | COMMUNITY
Start: 2023-11-15 | End: 1900-01-01

## 2024-03-12 ENCOUNTER — APPOINTMENT (OUTPATIENT)
Dept: NEPHROLOGY | Facility: CLINIC | Age: 79
End: 2024-03-12
Payer: MEDICARE

## 2024-03-12 VITALS — SYSTOLIC BLOOD PRESSURE: 130 MMHG | HEART RATE: 82 BPM | DIASTOLIC BLOOD PRESSURE: 80 MMHG

## 2024-03-12 VITALS — WEIGHT: 176 LBS | BODY MASS INDEX: 25.25 KG/M2

## 2024-03-12 PROCEDURE — 99214 OFFICE O/P EST MOD 30 MIN: CPT

## 2024-03-12 RX ORDER — METOPROLOL SUCCINATE 25 MG/1
25 TABLET, EXTENDED RELEASE ORAL
Qty: 33 | Refills: 0 | Status: DISCONTINUED | COMMUNITY
End: 2024-03-12

## 2024-03-13 LAB
APPEARANCE: CLEAR
BACTERIA: NEGATIVE /HPF
BILIRUBIN URINE: NEGATIVE
BLOOD URINE: NEGATIVE
CAST: 0 /LPF
COLOR: YELLOW
EPITHELIAL CELLS: 1 /HPF
GLUCOSE QUALITATIVE U: NEGATIVE MG/DL
KETONES URINE: NEGATIVE MG/DL
LEUKOCYTE ESTERASE URINE: ABNORMAL
MICROSCOPIC-UA: NORMAL
NITRITE URINE: NEGATIVE
PH URINE: 6.5
PROTEIN URINE: 30 MG/DL
RED BLOOD CELLS URINE: 2 /HPF
SPECIFIC GRAVITY URINE: 1.01
UROBILINOGEN URINE: 0.2 MG/DL
WHITE BLOOD CELLS URINE: 2 /HPF

## 2024-03-15 NOTE — ASSESSMENT
[FreeTextEntry1] : all lab data was reviewed with patient in detail from 1/17/2024 and 3/12/2024 78 yo man with labile HTN now on 3 agents, proteinuria. secondary etiologies  excluded -HTN- BP 1330/80 okay, but may benefit from lower goal- ABPM from 10/2023 shows BP above target 145/90 range with excellent dipping work on weight loss- to get  range- consider increase meds if he can tolerate after next OV (if no weight loss) -proteinuria-  Uprot/creat 1.4; Ualb/creat 734- c/w losartan, agree with recent increase to 50 mg; continue to up titrate and/or add sglt2i with f/u OVs-  check SPEP  OV in office 3-4 months

## 2024-03-15 NOTE — HISTORY OF PRESENT ILLNESS
[FreeTextEntry1] : 80 yo man with HTN, for follow up evaluation. recent echo/sono shows aneurysm stable home BPs not optimized given this h/o aneurysm  h/o thoracic aneurysm  No HA, N, V No NSAIDs

## 2024-03-15 NOTE — PHYSICAL EXAM
[General Appearance - Alert] : alert [General Appearance - In No Acute Distress] : in no acute distress [Auscultation Breath Sounds / Voice Sounds] : lungs were clear to auscultation bilaterally [] : no respiratory distress [Heart Rate And Rhythm] : heart rate was normal and rhythm regular [Heart Sounds] : normal S1 and S2 [Heart Sounds Gallop] : no gallops [Murmurs] : no murmurs [Heart Sounds Pericardial Friction Rub] : no pericardial rub [No CVA Tenderness] : no ~M costovertebral angle tenderness [Edema] : there was no peripheral edema [Oriented To Time, Place, And Person] : oriented to person, place, and time [Impaired Insight] : insight and judgment were intact [Affect] : the affect was normal [FreeTextEntry1] : 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

## 2024-03-31 LAB
CREAT SPEC-SCNC: 32 MG/DL
CREAT SPEC-SCNC: 32 MG/DL
CREAT/PROT UR: 1.4 RATIO
MICROALBUMIN 24H UR DL<=1MG/L-MCNC: 23.3 MG/DL
MICROALBUMIN/CREAT 24H UR-RTO: 734 MG/G
PROT UR-MCNC: 46 MG/DL

## 2024-04-08 ENCOUNTER — APPOINTMENT (OUTPATIENT)
Dept: HEART AND VASCULAR | Facility: CLINIC | Age: 79
End: 2024-04-08
Payer: MEDICARE

## 2024-04-08 VITALS
TEMPERATURE: 97.7 F | HEART RATE: 70 BPM | WEIGHT: 189.6 LBS | BODY MASS INDEX: 27.14 KG/M2 | OXYGEN SATURATION: 97 % | SYSTOLIC BLOOD PRESSURE: 155 MMHG | HEIGHT: 70 IN | DIASTOLIC BLOOD PRESSURE: 92 MMHG

## 2024-04-08 PROCEDURE — 99214 OFFICE O/P EST MOD 30 MIN: CPT

## 2024-04-08 RX ORDER — METOPROLOL TARTRATE 50 MG/1
50 TABLET, FILM COATED ORAL
Qty: 180 | Refills: 3 | Status: DISCONTINUED | COMMUNITY
End: 2024-04-08

## 2024-04-08 RX ORDER — AMLODIPINE BESYLATE 5 MG/1
5 TABLET ORAL
Qty: 180 | Refills: 3 | Status: DISCONTINUED | COMMUNITY
End: 2024-04-08

## 2024-04-08 NOTE — DISCUSSION/SUMMARY
[FreeTextEntry1] : 1.Htn- unable to tolerate beta blocker due to sob and fatigue  cardizem 240cd, losartan 100 -dc metoprolol and norvasc   2.aortic aneurysm - ccta with cors in june and followup   3.chol- repatha

## 2024-04-08 NOTE — REASON FOR VISIT
[CV Risk Factors and Non-Cardiac Disease] : CV risk factors and non-cardiac disease [FreeTextEntry1] : 80 yo male with pmh sig aortic aneurysm, being eval by Dr Mccracken  Unable to tolerate beta blocker, sob and fatigue.  CCTA with aorta and cors scheduled

## 2024-04-13 ENCOUNTER — APPOINTMENT (OUTPATIENT)
Dept: MRI IMAGING | Facility: HOSPITAL | Age: 79
End: 2024-04-13

## 2024-04-13 ENCOUNTER — OUTPATIENT (OUTPATIENT)
Dept: OUTPATIENT SERVICES | Facility: HOSPITAL | Age: 79
LOS: 1 days | End: 2024-04-13
Payer: MEDICARE

## 2024-04-13 DIAGNOSIS — Z98.89 OTHER SPECIFIED POSTPROCEDURAL STATES: Chronic | ICD-10-CM

## 2024-04-13 PROCEDURE — 70551 MRI BRAIN STEM W/O DYE: CPT | Mod: MH

## 2024-04-13 PROCEDURE — 70544 MR ANGIOGRAPHY HEAD W/O DYE: CPT | Mod: MH

## 2024-04-13 PROCEDURE — 70549 MR ANGIOGRAPH NECK W/O&W/DYE: CPT | Mod: 26,MH

## 2024-04-13 PROCEDURE — 70547 MR ANGIOGRAPHY NECK W/O DYE: CPT

## 2024-04-13 PROCEDURE — A9585: CPT

## 2024-04-13 PROCEDURE — 70549 MR ANGIOGRAPH NECK W/O&W/DYE: CPT | Mod: MH

## 2024-04-13 PROCEDURE — 70551 MRI BRAIN STEM W/O DYE: CPT | Mod: 26,MH

## 2024-04-13 PROCEDURE — 70544 MR ANGIOGRAPHY HEAD W/O DYE: CPT | Mod: 26,MH,XU

## 2024-05-02 ENCOUNTER — APPOINTMENT (OUTPATIENT)
Dept: HEART AND VASCULAR | Facility: CLINIC | Age: 79
End: 2024-05-02
Payer: MEDICARE

## 2024-05-02 VITALS
SYSTOLIC BLOOD PRESSURE: 156 MMHG | OXYGEN SATURATION: 97 % | WEIGHT: 196.04 LBS | HEIGHT: 70 IN | DIASTOLIC BLOOD PRESSURE: 90 MMHG | BODY MASS INDEX: 28.06 KG/M2 | HEART RATE: 85 BPM

## 2024-05-02 PROCEDURE — 99214 OFFICE O/P EST MOD 30 MIN: CPT

## 2024-05-02 RX ORDER — POTASSIUM CHLORIDE 750 MG/1
10 TABLET, FILM COATED, EXTENDED RELEASE ORAL DAILY
Qty: 90 | Refills: 3 | Status: ACTIVE | COMMUNITY
Start: 2024-05-02 | End: 1900-01-01

## 2024-05-02 NOTE — REASON FOR VISIT
[CV Risk Factors and Non-Cardiac Disease] : CV risk factors and non-cardiac disease [FreeTextEntry1] : 78 yo male with pmh sig for htn chol ascending thoracic aneurysm., 4.7 , reluctant to have cts consult Agreed to have repeat scan  in june   new le edema with verapamil and 20llb weight gain   no sob and no piña

## 2024-05-02 NOTE — DISCUSSION/SUMMARY
[FreeTextEntry1] : 1.Thoracic aneurysm- unable to tolerate verapamil or beta blockers, repeat scan in 3 months, reluctant to have cts consult   2.HTN- dc verpamil due to le edema and weight gain  losartan 50 bid, add hctz 25 q d   3. chol-crestor 10 qd

## 2024-05-06 ENCOUNTER — APPOINTMENT (OUTPATIENT)
Dept: NEPHROLOGY | Facility: CLINIC | Age: 79
End: 2024-05-06
Payer: MEDICARE

## 2024-05-06 VITALS
HEART RATE: 82 BPM | DIASTOLIC BLOOD PRESSURE: 82 MMHG | WEIGHT: 190 LBS | SYSTOLIC BLOOD PRESSURE: 136 MMHG | BODY MASS INDEX: 27.26 KG/M2

## 2024-05-06 PROCEDURE — 99214 OFFICE O/P EST MOD 30 MIN: CPT

## 2024-05-06 PROCEDURE — G2211 COMPLEX E/M VISIT ADD ON: CPT

## 2024-05-06 RX ORDER — FINASTERIDE 5 MG/1
5 TABLET, FILM COATED ORAL
Qty: 30 | Refills: 0 | Status: ACTIVE | COMMUNITY
Start: 2024-03-19

## 2024-05-06 RX ORDER — ROSUVASTATIN CALCIUM 5 MG/1
5 TABLET, FILM COATED ORAL
Qty: 30 | Refills: 0 | Status: DISCONTINUED | COMMUNITY
Start: 2024-03-19

## 2024-05-06 RX ORDER — ICOSAPENT ETHYL 1 G/1
1 CAPSULE ORAL
Qty: 120 | Refills: 0 | Status: ACTIVE | COMMUNITY
Start: 2024-03-19

## 2024-05-06 RX ORDER — TRAZODONE HYDROCHLORIDE 50 MG/1
50 TABLET ORAL
Qty: 90 | Refills: 0 | Status: ACTIVE | COMMUNITY
Start: 2023-02-02

## 2024-05-06 RX ORDER — DILTIAZEM HYDROCHLORIDE 240 MG/1
240 CAPSULE, EXTENDED RELEASE ORAL
Qty: 90 | Refills: 0 | Status: DISCONTINUED | COMMUNITY
Start: 2024-04-08

## 2024-05-06 RX ORDER — EVOLOCUMAB 140 MG/ML
140 INJECTION, SOLUTION SUBCUTANEOUS
Qty: 3 | Refills: 3 | Status: DISCONTINUED | COMMUNITY
Start: 2023-05-31 | End: 2024-05-06

## 2024-05-06 RX ORDER — EZETIMIBE 10 MG/1
10 TABLET ORAL
Qty: 30 | Refills: 0 | Status: ACTIVE | COMMUNITY
Start: 2023-11-14

## 2024-05-06 RX ORDER — ALPRAZOLAM 0.25 MG/1
0.25 TABLET ORAL
Qty: 60 | Refills: 0 | Status: ACTIVE | COMMUNITY
Start: 2024-03-19

## 2024-05-06 RX ORDER — METOPROLOL TARTRATE 25 MG/1
25 TABLET, FILM COATED ORAL
Qty: 120 | Refills: 0 | Status: DISCONTINUED | COMMUNITY
Start: 2024-01-30

## 2024-05-06 RX ORDER — METFORMIN HYDROCHLORIDE 1000 MG/1
1000 TABLET, FILM COATED, EXTENDED RELEASE ORAL
Refills: 0 | Status: ACTIVE | COMMUNITY

## 2024-05-06 RX ORDER — AMLODIPINE BESYLATE 2.5 MG/1
2.5 TABLET ORAL
Qty: 30 | Refills: 0 | Status: DISCONTINUED | COMMUNITY
Start: 2024-03-26

## 2024-05-06 RX ORDER — DILTIAZEM HYDROCHLORIDE 240 MG/1
240 CAPSULE, EXTENDED RELEASE ORAL DAILY
Qty: 90 | Refills: 3 | Status: DISCONTINUED | COMMUNITY
Start: 2024-04-08 | End: 2024-05-06

## 2024-05-06 RX ORDER — VALACYCLOVIR 500 MG/1
500 TABLET, FILM COATED ORAL
Qty: 60 | Refills: 0 | Status: ACTIVE | COMMUNITY
Start: 2023-10-26

## 2024-05-06 NOTE — HISTORY OF PRESENT ILLNESS
[FreeTextEntry1] : 78 yo man here for f/u evaluation of  proteinuria,  HTN, CKD 1.  Last OV BPs    creat .82; U prto/creat 1.4 with Ualb/creat 734; u/a without blood. BPs have been labile and also has developed edema in legs last OV for LE edema and new proteinuria increased losartan from 25 to 50 mg- and dc'd amlodipine- tolerating well Dr. Watson dc'd diltiazem and added HCTZ and KCL when he saw increase in LE edema reports home BPs running [id number] range h/o thoracic aneurysm  No HA, N, V No NSAIDs

## 2024-05-06 NOTE — PHYSICAL EXAM
[General Appearance - Alert] : alert [General Appearance - In No Acute Distress] : in no acute distress [] : no respiratory distress [Auscultation Breath Sounds / Voice Sounds] : lungs were clear to auscultation bilaterally [Heart Rate And Rhythm] : heart rate was normal and rhythm regular [Heart Sounds] : normal S1 and S2 [Heart Sounds Gallop] : no gallops [Murmurs] : no murmurs [Heart Sounds Pericardial Friction Rub] : no pericardial rub [Oriented To Time, Place, And Person] : oriented to person, place, and time [Impaired Insight] : insight and judgment were intact [Affect] : the affect was normal [FreeTextEntry1] : 1+ LE edema

## 2024-05-08 LAB
APPEARANCE: CLEAR
BACTERIA: NEGATIVE /HPF
BILIRUBIN URINE: NEGATIVE
BLOOD URINE: NEGATIVE
CAST: 0 /LPF
COLOR: YELLOW
CREAT SPEC-SCNC: 55 MG/DL
CREAT SPEC-SCNC: 55 MG/DL
CREAT/PROT UR: 0.8 RATIO
EPITHELIAL CELLS: 1 /HPF
GLUCOSE QUALITATIVE U: NEGATIVE MG/DL
KETONES URINE: NEGATIVE MG/DL
LEUKOCYTE ESTERASE URINE: ABNORMAL
MICROALBUMIN 24H UR DL<=1MG/L-MCNC: 22.1 MG/DL
MICROALBUMIN/CREAT 24H UR-RTO: 403 MG/G
MICROSCOPIC-UA: NORMAL
NITRITE URINE: NEGATIVE
PH URINE: 6.5
PROT UR-MCNC: 45 MG/DL
PROTEIN URINE: 30 MG/DL
RED BLOOD CELLS URINE: 1 /HPF
SPECIFIC GRAVITY URINE: 1.02
UROBILINOGEN URINE: 0.2 MG/DL
WHITE BLOOD CELLS URINE: 2 /HPF

## 2024-06-19 ENCOUNTER — APPOINTMENT (OUTPATIENT)
Dept: CT IMAGING | Facility: HOSPITAL | Age: 79
End: 2024-06-19

## 2024-06-19 ENCOUNTER — APPOINTMENT (OUTPATIENT)
Dept: NEPHROLOGY | Facility: CLINIC | Age: 79
End: 2024-06-19
Payer: MEDICARE

## 2024-06-19 VITALS — DIASTOLIC BLOOD PRESSURE: 68 MMHG | SYSTOLIC BLOOD PRESSURE: 110 MMHG | HEART RATE: 84 BPM

## 2024-06-19 DIAGNOSIS — R60.9 EDEMA, UNSPECIFIED: ICD-10-CM

## 2024-06-19 DIAGNOSIS — I10 ESSENTIAL (PRIMARY) HYPERTENSION: ICD-10-CM

## 2024-06-19 DIAGNOSIS — I71.20 THORACIC AORTIC ANEURYSM, WITHOUT RUPTURE, UNSPECIFIED: ICD-10-CM

## 2024-06-19 DIAGNOSIS — R80.9 PROTEINURIA, UNSPECIFIED: ICD-10-CM

## 2024-06-19 DIAGNOSIS — E78.5 HYPERLIPIDEMIA, UNSPECIFIED: ICD-10-CM

## 2024-06-19 PROCEDURE — 99214 OFFICE O/P EST MOD 30 MIN: CPT

## 2024-06-19 PROCEDURE — G2211 COMPLEX E/M VISIT ADD ON: CPT

## 2024-06-19 RX ORDER — HYDROCHLOROTHIAZIDE 25 MG/1
25 TABLET ORAL DAILY
Qty: 90 | Refills: 3 | Status: DISCONTINUED | COMMUNITY
Start: 2024-05-02 | End: 2024-06-19

## 2024-06-19 RX ORDER — METOPROLOL TARTRATE 25 MG/1
25 TABLET, FILM COATED ORAL TWICE DAILY
Refills: 0 | Status: ACTIVE | COMMUNITY
Start: 2024-06-19

## 2024-06-19 NOTE — HISTORY OF PRESENT ILLNESS
[FreeTextEntry1] : 80 yo man here for f/u evaluation of  proteinuria,  DM HTN, CKD 1.  offers that meds were changed- off HCTZ and KCl for cramps and Na 133 added metoprolol LE edema off amlodipine and verapamil reports home BPs running 120-130 range h/o thoracic aneurysm  No HA, N, V No NSAIDs   ABPM 10/2023 shows BP above target 145/90 range with excellent dipping

## 2024-06-19 NOTE — PHYSICAL EXAM
[General Appearance - Alert] : alert [General Appearance - In No Acute Distress] : in no acute distress [] : no respiratory distress [Auscultation Breath Sounds / Voice Sounds] : lungs were clear to auscultation bilaterally [Heart Rate And Rhythm] : heart rate was normal and rhythm regular [Heart Sounds] : normal S1 and S2 [Heart Sounds Gallop] : no gallops [Murmurs] : no murmurs [Heart Sounds Pericardial Friction Rub] : no pericardial rub [Oriented To Time, Place, And Person] : oriented to person, place, and time [Impaired Insight] : insight and judgment were intact [Affect] : the affect was normal [FreeTextEntry1] : tr LE edema

## 2024-06-19 NOTE — ASSESSMENT
[FreeTextEntry1] : reviewed all labs from 5/29/24 in detail  78 yo man with labile HTN, proteinuria, LE edema CKD1  secondary etiologies  excluded CKD1: sCr stable at .83, electrolytes wnl  no hematuria -Proteinuria-  c/w Losartan: down to 200mg from 450mg  monitor BP; detailed discussion, should add sglt2i as next step if does not continue to trend down -Edema- improved, likely due to Amlodipine  -HTN- BP at goal agree with dc HCTZ  and KCl Continue with diet and exercise   expect Na to improve off HCTZ   Follow up in next 3-4 months

## 2024-07-15 ENCOUNTER — APPOINTMENT (OUTPATIENT)
Dept: NEPHROLOGY | Facility: CLINIC | Age: 79
End: 2024-07-15

## 2024-07-17 ENCOUNTER — NON-APPOINTMENT (OUTPATIENT)
Age: 79
End: 2024-07-17

## 2024-07-17 ENCOUNTER — APPOINTMENT (OUTPATIENT)
Dept: HEART AND VASCULAR | Facility: CLINIC | Age: 79
End: 2024-07-17
Payer: MEDICARE

## 2024-07-17 VITALS
SYSTOLIC BLOOD PRESSURE: 112 MMHG | HEART RATE: 103 BPM | DIASTOLIC BLOOD PRESSURE: 96 MMHG | BODY MASS INDEX: 27.06 KG/M2 | OXYGEN SATURATION: 96 % | WEIGHT: 189 LBS | HEIGHT: 70 IN

## 2024-07-17 PROCEDURE — G2211 COMPLEX E/M VISIT ADD ON: CPT

## 2024-07-17 PROCEDURE — 99214 OFFICE O/P EST MOD 30 MIN: CPT

## 2024-07-17 RX ORDER — MINOXIDIL 2.5 MG/1
2.5 TABLET ORAL
Qty: 60 | Refills: 0 | Status: ACTIVE | COMMUNITY
Start: 2024-05-13

## 2024-07-17 RX ORDER — DOXYCYCLINE HYCLATE 100 MG/1
100 TABLET ORAL
Qty: 60 | Refills: 0 | Status: ACTIVE | COMMUNITY
Start: 2024-06-20

## 2024-07-31 ENCOUNTER — OUTPATIENT (OUTPATIENT)
Dept: OUTPATIENT SERVICES | Facility: HOSPITAL | Age: 79
LOS: 1 days | End: 2024-07-31
Payer: MEDICARE

## 2024-07-31 DIAGNOSIS — E61.1 IRON DEFICIENCY: ICD-10-CM

## 2024-07-31 DIAGNOSIS — Z98.89 OTHER SPECIFIED POSTPROCEDURAL STATES: Chronic | ICD-10-CM

## 2024-07-31 PROCEDURE — 91110 GI TRC IMG INTRAL ESOPH-ILE: CPT

## 2024-08-07 ENCOUNTER — OUTPATIENT (OUTPATIENT)
Dept: OUTPATIENT SERVICES | Facility: HOSPITAL | Age: 79
LOS: 1 days | End: 2024-08-07

## 2024-08-07 ENCOUNTER — APPOINTMENT (OUTPATIENT)
Dept: MRI IMAGING | Facility: HOSPITAL | Age: 79
End: 2024-08-07

## 2024-08-07 ENCOUNTER — OUTPATIENT (OUTPATIENT)
Dept: OUTPATIENT SERVICES | Facility: HOSPITAL | Age: 79
LOS: 1 days | End: 2024-08-07
Payer: MEDICARE

## 2024-08-07 DIAGNOSIS — Z98.89 OTHER SPECIFIED POSTPROCEDURAL STATES: Chronic | ICD-10-CM

## 2024-08-07 DIAGNOSIS — R06.02 SHORTNESS OF BREATH: ICD-10-CM

## 2024-08-07 PROCEDURE — 94726 PLETHYSMOGRAPHY LUNG VOLUMES: CPT | Mod: 26

## 2024-08-07 PROCEDURE — 71555 MRI ANGIO CHEST W OR W/O DYE: CPT

## 2024-08-07 PROCEDURE — 94726 PLETHYSMOGRAPHY LUNG VOLUMES: CPT

## 2024-08-07 PROCEDURE — 94729 DIFFUSING CAPACITY: CPT | Mod: 26

## 2024-08-07 PROCEDURE — 94729 DIFFUSING CAPACITY: CPT

## 2024-08-07 PROCEDURE — A9585: CPT

## 2024-08-07 PROCEDURE — 94060 EVALUATION OF WHEEZING: CPT

## 2024-08-07 PROCEDURE — 94010 BREATHING CAPACITY TEST: CPT | Mod: 26

## 2024-08-07 PROCEDURE — 94760 N-INVAS EAR/PLS OXIMETRY 1: CPT

## 2024-08-14 ENCOUNTER — APPOINTMENT (OUTPATIENT)
Dept: NEPHROLOGY | Facility: CLINIC | Age: 79
End: 2024-08-14
Payer: MEDICARE

## 2024-08-14 VITALS — DIASTOLIC BLOOD PRESSURE: 78 MMHG | SYSTOLIC BLOOD PRESSURE: 114 MMHG | HEART RATE: 88 BPM

## 2024-08-14 DIAGNOSIS — E78.5 HYPERLIPIDEMIA, UNSPECIFIED: ICD-10-CM

## 2024-08-14 DIAGNOSIS — I10 ESSENTIAL (PRIMARY) HYPERTENSION: ICD-10-CM

## 2024-08-14 DIAGNOSIS — R80.9 PROTEINURIA, UNSPECIFIED: ICD-10-CM

## 2024-08-14 PROCEDURE — 99214 OFFICE O/P EST MOD 30 MIN: CPT

## 2024-08-14 PROCEDURE — G2211 COMPLEX E/M VISIT ADD ON: CPT

## 2024-08-19 LAB
APPEARANCE: CLEAR
BACTERIA: NEGATIVE /HPF
BILIRUBIN URINE: NEGATIVE
BLOOD URINE: NEGATIVE
CAST: 0 /LPF
COLOR: YELLOW
CREAT SPEC-SCNC: 60 MG/DL
EPITHELIAL CELLS: 3 /HPF
GLUCOSE QUALITATIVE U: NEGATIVE MG/DL
KETONES URINE: NEGATIVE MG/DL
LEUKOCYTE ESTERASE URINE: ABNORMAL
MICROALBUMIN 24H UR DL<=1MG/L-MCNC: 30.7 MG/DL
MICROALBUMIN/CREAT 24H UR-RTO: 513 MG/G
MICROSCOPIC-UA: NORMAL
NITRITE URINE: NEGATIVE
PH URINE: 6
PROTEIN URINE: 100 MG/DL
RED BLOOD CELLS URINE: NORMAL /HPF
REVIEW: NORMAL
SPECIFIC GRAVITY URINE: 1.01
UROBILINOGEN URINE: 0.2 MG/DL
WHITE BLOOD CELLS URINE: 2 /HPF

## 2024-08-26 ENCOUNTER — APPOINTMENT (OUTPATIENT)
Dept: PULMONOLOGY | Facility: CLINIC | Age: 79
End: 2024-08-26

## 2024-09-09 ENCOUNTER — APPOINTMENT (OUTPATIENT)
Dept: NEPHROLOGY | Facility: CLINIC | Age: 79
End: 2024-09-09

## 2024-10-07 ENCOUNTER — APPOINTMENT (OUTPATIENT)
Dept: HEART AND VASCULAR | Facility: CLINIC | Age: 79
End: 2024-10-07

## 2024-10-07 PROCEDURE — 99213 OFFICE O/P EST LOW 20 MIN: CPT

## 2024-10-28 ENCOUNTER — APPOINTMENT (OUTPATIENT)
Dept: PULMONOLOGY | Facility: CLINIC | Age: 79
End: 2024-10-28

## 2024-11-11 ENCOUNTER — APPOINTMENT (OUTPATIENT)
Dept: NEPHROLOGY | Facility: CLINIC | Age: 79
End: 2024-11-11
Payer: MEDICARE

## 2024-11-11 VITALS — SYSTOLIC BLOOD PRESSURE: 122 MMHG | DIASTOLIC BLOOD PRESSURE: 72 MMHG | HEART RATE: 86 BPM

## 2024-11-11 DIAGNOSIS — R80.9 PROTEINURIA, UNSPECIFIED: ICD-10-CM

## 2024-11-11 DIAGNOSIS — N18.2 CHRONIC KIDNEY DISEASE, STAGE 2 (MILD): ICD-10-CM

## 2024-11-11 DIAGNOSIS — I10 ESSENTIAL (PRIMARY) HYPERTENSION: ICD-10-CM

## 2024-11-11 DIAGNOSIS — E78.5 HYPERLIPIDEMIA, UNSPECIFIED: ICD-10-CM

## 2024-11-11 PROCEDURE — G2211 COMPLEX E/M VISIT ADD ON: CPT

## 2024-11-11 PROCEDURE — 99214 OFFICE O/P EST MOD 30 MIN: CPT

## 2024-11-11 RX ORDER — HYDROCHLOROTHIAZIDE 25 MG/1
25 TABLET ORAL
Refills: 0 | Status: ACTIVE | COMMUNITY

## 2024-11-12 ENCOUNTER — NON-APPOINTMENT (OUTPATIENT)
Age: 79
End: 2024-11-12

## 2024-11-14 PROBLEM — N18.2 CKD (CHRONIC KIDNEY DISEASE), STAGE II: Status: ACTIVE | Noted: 2024-11-14

## 2024-11-14 LAB
APPEARANCE: CLEAR
BACTERIA: NEGATIVE /HPF
BILIRUBIN URINE: NEGATIVE
BLOOD URINE: NEGATIVE
CAST: 2 /LPF
COARSE GRANULAR CASTS: PRESENT
COLOR: YELLOW
CREAT SPEC-SCNC: 50 MG/DL
EPITHELIAL CELLS: 1 /HPF
GLUCOSE QUALITATIVE U: NEGATIVE MG/DL
KETONES URINE: NEGATIVE MG/DL
LEUKOCYTE ESTERASE URINE: ABNORMAL
MICROALBUMIN 24H UR DL<=1MG/L-MCNC: 16.7 MG/DL
MICROALBUMIN/CREAT 24H UR-RTO: 331 MG/G
MICROSCOPIC-UA: NORMAL
NITRITE URINE: NEGATIVE
PH URINE: 6
PROTEIN URINE: 30 MG/DL
RED BLOOD CELLS URINE: NORMAL /HPF
REVIEW: NORMAL
SPECIFIC GRAVITY URINE: 1.01
UROBILINOGEN URINE: 0.2 MG/DL
WHITE BLOOD CELLS URINE: 17 /HPF

## 2024-12-18 ENCOUNTER — APPOINTMENT (OUTPATIENT)
Dept: HEART AND VASCULAR | Facility: CLINIC | Age: 79
End: 2024-12-18
Payer: MEDICARE

## 2024-12-18 ENCOUNTER — APPOINTMENT (OUTPATIENT)
Dept: HEART AND VASCULAR | Facility: CLINIC | Age: 79
End: 2024-12-18

## 2024-12-18 PROCEDURE — 99214 OFFICE O/P EST MOD 30 MIN: CPT

## 2025-01-07 DIAGNOSIS — R07.9 CHEST PAIN, UNSPECIFIED: ICD-10-CM

## 2025-01-07 DIAGNOSIS — N18.2 CHRONIC KIDNEY DISEASE, STAGE 2 (MILD): ICD-10-CM

## 2025-01-08 ENCOUNTER — APPOINTMENT (OUTPATIENT)
Dept: HEART AND VASCULAR | Facility: CLINIC | Age: 80
End: 2025-01-08
Payer: MEDICARE

## 2025-01-08 VITALS
HEIGHT: 70 IN | SYSTOLIC BLOOD PRESSURE: 134 MMHG | DIASTOLIC BLOOD PRESSURE: 71 MMHG | OXYGEN SATURATION: 96 % | HEART RATE: 88 BPM | WEIGHT: 184 LBS | BODY MASS INDEX: 26.34 KG/M2

## 2025-01-08 PROCEDURE — ZZZZZ: CPT | Mod: NC

## 2025-01-08 PROCEDURE — 99214 OFFICE O/P EST MOD 30 MIN: CPT | Mod: 25

## 2025-01-08 PROCEDURE — G2211 COMPLEX E/M VISIT ADD ON: CPT

## 2025-01-08 PROCEDURE — 93306 TTE W/DOPPLER COMPLETE: CPT

## 2025-01-08 PROCEDURE — 93015 CV STRESS TEST SUPVJ I&R: CPT

## 2025-02-06 DIAGNOSIS — E78.5 HYPERLIPIDEMIA, UNSPECIFIED: ICD-10-CM

## 2025-02-06 DIAGNOSIS — R07.9 CHEST PAIN, UNSPECIFIED: ICD-10-CM

## 2025-02-24 ENCOUNTER — APPOINTMENT (OUTPATIENT)
Dept: CT IMAGING | Facility: HOSPITAL | Age: 80
End: 2025-02-24

## 2025-02-24 ENCOUNTER — OUTPATIENT (OUTPATIENT)
Dept: OUTPATIENT SERVICES | Facility: HOSPITAL | Age: 80
LOS: 1 days | End: 2025-02-24
Payer: MEDICARE

## 2025-02-24 DIAGNOSIS — Z98.89 OTHER SPECIFIED POSTPROCEDURAL STATES: Chronic | ICD-10-CM

## 2025-02-24 PROCEDURE — 75574 CT ANGIO HRT W/3D IMAGE: CPT | Mod: 26

## 2025-02-24 PROCEDURE — 82565 ASSAY OF CREATININE: CPT

## 2025-02-24 PROCEDURE — 75574 CT ANGIO HRT W/3D IMAGE: CPT

## 2025-02-25 ENCOUNTER — OUTPATIENT (OUTPATIENT)
Dept: OUTPATIENT SERVICES | Facility: HOSPITAL | Age: 80
LOS: 1 days | End: 2025-02-25
Payer: MEDICARE

## 2025-02-25 ENCOUNTER — RESULT REVIEW (OUTPATIENT)
Age: 80
End: 2025-02-25

## 2025-02-25 DIAGNOSIS — Z98.89 OTHER SPECIFIED POSTPROCEDURAL STATES: Chronic | ICD-10-CM

## 2025-02-25 PROCEDURE — 75580 N-INVAS EST C FFR SW ALY CTA: CPT

## 2025-02-25 PROCEDURE — 75580 N-INVAS EST C FFR SW ALY CTA: CPT | Mod: 26

## 2025-03-19 ENCOUNTER — APPOINTMENT (OUTPATIENT)
Dept: HEART AND VASCULAR | Facility: CLINIC | Age: 80
End: 2025-03-19

## 2025-04-22 DIAGNOSIS — N18.2 CHRONIC KIDNEY DISEASE, STAGE 2 (MILD): ICD-10-CM

## 2025-04-22 DIAGNOSIS — R07.9 CHEST PAIN, UNSPECIFIED: ICD-10-CM

## 2025-05-05 ENCOUNTER — APPOINTMENT (OUTPATIENT)
Dept: NEPHROLOGY | Facility: CLINIC | Age: 80
End: 2025-05-05
Payer: MEDICARE

## 2025-05-05 VITALS — DIASTOLIC BLOOD PRESSURE: 76 MMHG | SYSTOLIC BLOOD PRESSURE: 132 MMHG | HEART RATE: 82 BPM

## 2025-05-05 DIAGNOSIS — N18.2 CHRONIC KIDNEY DISEASE, STAGE 2 (MILD): ICD-10-CM

## 2025-05-05 DIAGNOSIS — E78.5 HYPERLIPIDEMIA, UNSPECIFIED: ICD-10-CM

## 2025-05-05 DIAGNOSIS — I10 ESSENTIAL (PRIMARY) HYPERTENSION: ICD-10-CM

## 2025-05-05 DIAGNOSIS — R80.9 PROTEINURIA, UNSPECIFIED: ICD-10-CM

## 2025-05-05 PROCEDURE — 99214 OFFICE O/P EST MOD 30 MIN: CPT

## 2025-05-05 PROCEDURE — G2211 COMPLEX E/M VISIT ADD ON: CPT

## 2025-05-14 ENCOUNTER — APPOINTMENT (OUTPATIENT)
Dept: HEART AND VASCULAR | Facility: CLINIC | Age: 80
End: 2025-05-14

## 2025-05-14 PROCEDURE — 99214 OFFICE O/P EST MOD 30 MIN: CPT | Mod: 2W

## 2025-06-25 ENCOUNTER — APPOINTMENT (OUTPATIENT)
Dept: HEART AND VASCULAR | Facility: CLINIC | Age: 80
End: 2025-06-25
Payer: MEDICARE

## 2025-06-25 VITALS
HEART RATE: 71 BPM | BODY MASS INDEX: 26.48 KG/M2 | WEIGHT: 185 LBS | SYSTOLIC BLOOD PRESSURE: 91 MMHG | OXYGEN SATURATION: 98 % | DIASTOLIC BLOOD PRESSURE: 50 MMHG | HEIGHT: 70 IN

## 2025-06-25 PROCEDURE — 99214 OFFICE O/P EST MOD 30 MIN: CPT

## 2025-06-25 PROCEDURE — 93000 ELECTROCARDIOGRAM COMPLETE: CPT

## 2025-06-25 PROCEDURE — 93306 TTE W/DOPPLER COMPLETE: CPT

## 2025-06-25 PROCEDURE — G2211 COMPLEX E/M VISIT ADD ON: CPT

## 2025-06-25 PROCEDURE — ZZZZZ: CPT | Mod: NC

## 2025-06-30 ENCOUNTER — APPOINTMENT (OUTPATIENT)
Dept: ULTRASOUND IMAGING | Facility: HOSPITAL | Age: 80
End: 2025-06-30
Payer: MEDICARE

## 2025-06-30 ENCOUNTER — OUTPATIENT (OUTPATIENT)
Dept: OUTPATIENT SERVICES | Facility: HOSPITAL | Age: 80
LOS: 1 days | End: 2025-06-30

## 2025-06-30 ENCOUNTER — APPOINTMENT (OUTPATIENT)
Dept: CT IMAGING | Facility: HOSPITAL | Age: 80
End: 2025-06-30
Payer: MEDICARE

## 2025-06-30 DIAGNOSIS — Z98.89 OTHER SPECIFIED POSTPROCEDURAL STATES: Chronic | ICD-10-CM

## 2025-06-30 PROCEDURE — 76700 US EXAM ABDOM COMPLETE: CPT | Mod: 26

## 2025-06-30 PROCEDURE — 71250 CT THORAX DX C-: CPT | Mod: 26

## 2025-06-30 PROCEDURE — 76700 US EXAM ABDOM COMPLETE: CPT

## 2025-06-30 PROCEDURE — 71250 CT THORAX DX C-: CPT

## 2025-07-14 ENCOUNTER — RX RENEWAL (OUTPATIENT)
Age: 80
End: 2025-07-14

## 2025-07-15 ENCOUNTER — OUTPATIENT (OUTPATIENT)
Dept: OUTPATIENT SERVICES | Facility: HOSPITAL | Age: 80
LOS: 1 days | End: 2025-07-15

## 2025-07-15 ENCOUNTER — APPOINTMENT (OUTPATIENT)
Dept: PLASTIC SURGERY | Facility: CLINIC | Age: 80
End: 2025-07-15

## 2025-07-15 VITALS — DIASTOLIC BLOOD PRESSURE: 79 MMHG | SYSTOLIC BLOOD PRESSURE: 140 MMHG | RESPIRATION RATE: 15 BRPM | HEART RATE: 78 BPM

## 2025-07-15 DIAGNOSIS — Z98.89 OTHER SPECIFIED POSTPROCEDURAL STATES: Chronic | ICD-10-CM

## 2025-07-15 PROBLEM — Z86.39 HISTORY OF DIABETES MELLITUS: Status: RESOLVED | Noted: 2025-07-15 | Resolved: 2025-07-15

## 2025-07-16 DIAGNOSIS — Z01.89 ENCOUNTER FOR OTHER SPECIFIED SPECIAL EXAMINATIONS: ICD-10-CM

## 2025-07-28 ENCOUNTER — APPOINTMENT (OUTPATIENT)
Dept: PULMONOLOGY | Facility: CLINIC | Age: 80
End: 2025-07-28
Payer: MEDICARE

## 2025-07-28 ENCOUNTER — NON-APPOINTMENT (OUTPATIENT)
Age: 80
End: 2025-07-28

## 2025-07-28 VITALS
HEIGHT: 70 IN | DIASTOLIC BLOOD PRESSURE: 75 MMHG | OXYGEN SATURATION: 96 % | WEIGHT: 190 LBS | RESPIRATION RATE: 14 BRPM | BODY MASS INDEX: 27.2 KG/M2 | HEART RATE: 98 BPM | SYSTOLIC BLOOD PRESSURE: 129 MMHG | TEMPERATURE: 98 F

## 2025-07-28 DIAGNOSIS — R06.02 SHORTNESS OF BREATH: ICD-10-CM

## 2025-07-28 DIAGNOSIS — R93.89 ABNORMAL FINDINGS ON DIAGNOSTIC IMAGING OF OTHER SPECIFIED BODY STRUCTURES: ICD-10-CM

## 2025-07-28 PROCEDURE — 99214 OFFICE O/P EST MOD 30 MIN: CPT

## 2025-07-28 PROCEDURE — G2211 COMPLEX E/M VISIT ADD ON: CPT

## 2025-07-30 PROBLEM — R93.89 ABNORMAL CHEST CT: Status: ACTIVE | Noted: 2025-07-30
